# Patient Record
Sex: MALE | Race: WHITE
[De-identification: names, ages, dates, MRNs, and addresses within clinical notes are randomized per-mention and may not be internally consistent; named-entity substitution may affect disease eponyms.]

---

## 2017-06-23 ENCOUNTER — HOSPITAL ENCOUNTER (INPATIENT)
Dept: HOSPITAL 17 - NEPD | Age: 62
LOS: 3 days | Discharge: NURSING, REHAB OR LONG-TERM CARE | DRG: 885 | End: 2017-06-26
Attending: PSYCHIATRY & NEUROLOGY | Admitting: PSYCHIATRY & NEUROLOGY
Payer: MEDICAID

## 2017-06-23 VITALS
RESPIRATION RATE: 20 BRPM | OXYGEN SATURATION: 96 % | HEART RATE: 66 BPM | DIASTOLIC BLOOD PRESSURE: 93 MMHG | SYSTOLIC BLOOD PRESSURE: 130 MMHG

## 2017-06-23 VITALS
SYSTOLIC BLOOD PRESSURE: 130 MMHG | OXYGEN SATURATION: 96 % | TEMPERATURE: 97.7 F | RESPIRATION RATE: 20 BRPM | HEART RATE: 60 BPM | DIASTOLIC BLOOD PRESSURE: 86 MMHG

## 2017-06-23 VITALS
TEMPERATURE: 97.2 F | DIASTOLIC BLOOD PRESSURE: 77 MMHG | OXYGEN SATURATION: 97 % | SYSTOLIC BLOOD PRESSURE: 119 MMHG | HEART RATE: 71 BPM | RESPIRATION RATE: 18 BRPM

## 2017-06-23 VITALS — WEIGHT: 154.32 LBS | BODY MASS INDEX: 23.39 KG/M2 | HEIGHT: 68 IN

## 2017-06-23 DIAGNOSIS — J44.9: ICD-10-CM

## 2017-06-23 DIAGNOSIS — F20.0: Primary | ICD-10-CM

## 2017-06-23 DIAGNOSIS — M19.90: ICD-10-CM

## 2017-06-23 DIAGNOSIS — R47.9: ICD-10-CM

## 2017-06-23 LAB
AMPHETAMINE, URINE: (no result)
ANION GAP SERPL CALC-SCNC: 4 MEQ/L (ref 5–15)
BARBITURATES, URINE: (no result)
BASOPHILS # BLD AUTO: 0 TH/MM3 (ref 0–0.2)
BASOPHILS NFR BLD: 1.1 % (ref 0–2)
BUN SERPL-MCNC: 20 MG/DL (ref 7–18)
CHLORIDE SERPL-SCNC: 109 MEQ/L (ref 98–107)
COCAINE UR-MCNC: (no result) NG/ML
EOSINOPHIL # BLD: 0.3 TH/MM3 (ref 0–0.4)
EOSINOPHIL NFR BLD: 7.5 % (ref 0–4)
ERYTHROCYTE [DISTWIDTH] IN BLOOD BY AUTOMATED COUNT: 14.9 % (ref 11.6–17.2)
GFR SERPLBLD BASED ON 1.73 SQ M-ARVRAT: 98 ML/MIN (ref 89–?)
HCO3 BLD-SCNC: 32.4 MEQ/L (ref 21–32)
HCT VFR BLD CALC: 40.5 % (ref 39–51)
HEMO FLAGS: (no result)
LYMPHOCYTES # BLD AUTO: 2 TH/MM3 (ref 1–4.8)
LYMPHOCYTES NFR BLD AUTO: 43.2 % (ref 9–44)
MCH RBC QN AUTO: 30.7 PG (ref 27–34)
MCHC RBC AUTO-ENTMCNC: 34.7 % (ref 32–36)
MCV RBC AUTO: 88.5 FL (ref 80–100)
MONOCYTES NFR BLD: 9 % (ref 0–8)
NEUTROPHILS # BLD AUTO: 1.8 TH/MM3 (ref 1.8–7.7)
NEUTROPHILS NFR BLD AUTO: 39.2 % (ref 16–70)
PLATELET # BLD: 227 TH/MM3 (ref 150–450)
POTASSIUM SERPL-SCNC: 4.2 MEQ/L (ref 3.5–5.1)
RBC # BLD AUTO: 4.58 MIL/MM3 (ref 4.5–5.9)
SODIUM SERPL-SCNC: 145 MEQ/L (ref 136–145)
WBC # BLD AUTO: 4.6 TH/MM3 (ref 4–11)

## 2017-06-23 PROCEDURE — 80307 DRUG TEST PRSMV CHEM ANLYZR: CPT

## 2017-06-23 PROCEDURE — 84443 ASSAY THYROID STIM HORMONE: CPT

## 2017-06-23 PROCEDURE — 83036 HEMOGLOBIN GLYCOSYLATED A1C: CPT

## 2017-06-23 PROCEDURE — 82306 VITAMIN D 25 HYDROXY: CPT

## 2017-06-23 PROCEDURE — 80053 COMPREHEN METABOLIC PANEL: CPT

## 2017-06-23 PROCEDURE — 80061 LIPID PANEL: CPT

## 2017-06-23 PROCEDURE — 82607 VITAMIN B-12: CPT

## 2017-06-23 PROCEDURE — 85025 COMPLETE CBC W/AUTO DIFF WBC: CPT

## 2017-06-23 PROCEDURE — 80164 ASSAY DIPROPYLACETIC ACD TOT: CPT

## 2017-06-23 PROCEDURE — 80048 BASIC METABOLIC PNL TOTAL CA: CPT

## 2017-06-23 RX ADMIN — GABAPENTIN SCH MG: 100 CAPSULE ORAL at 18:00

## 2017-06-23 RX ADMIN — MIRTAZAPINE SCH MG: 15 TABLET, FILM COATED ORAL at 21:20

## 2017-06-23 RX ADMIN — DIVALPROEX SODIUM SCH MG: 500 TABLET, EXTENDED RELEASE ORAL at 21:21

## 2017-06-23 NOTE — HHI.HP
Provisional Diagnosis


Admission Date


Jun 23, 2017 at 13:23


[place]


Chronic paranoid schizophrenia





                               Certification of Person's Competence 


                           To Provide Express and Informed Consent





I have personally examined Blayne Martines , a person being served 

at [facility] on, Jun 23, 2017 13:38.


Express and informed consent means consent voluntarily given in writing, by a 

competent person, after sufficient explanation and disclosure of the subject 

matter involved to enable the person to make a knowing and willful decision 

without any element of force, fraud, deceit, duress, or other form of 

constraint or coercion.





This person is 18 years of age or older, is not now known to be incompetent to 

consent to treatment with a guardian advocate, and does not have a health care 

surrogate or proxy currently making medical treatment decisions.  I have found 

this person to be one of the following:





[X] Competent to provide express and informed consent, as defined above, for 

voluntary admission to this facility and is competent to provide express and 

informed consent for treatment.  He/she has the consistent capacity to make 

well reasoned, willful, and knowing decisions concerning his or her medical or 

mental health treatment.  The person fully and consistently understands the 

purpose of the admission for examination/placement and is fully capable of 

personally exercising all rights [place]





[] Incompetent to provide express and informed consent to voluntary admission, 

and this is incompetent to provide express and informed consent to treatment.  

The person must be transferred to involuntary status and a petition for a 

guardian advocate filed with the Circuit Court.





[] Refusing to provide express and informed consent to voluntary admission but 

is competent to provide express and informed consent for treatment.  The person 

must be discharged or transferred to involuntary status.





Form shall be completed within 24 hours of a person's arrival at the receiving 

facility and filed in the clinical record of each person:


1. Admitted on a voluntary basis


2. Permitted to provide express and informed consent to his/her own treatment


3. Allowed to transfer from involuntary to voluntary status


4. Prior to permitting a person to consent to his or her own treatment after 

having been previously found incompetent to consent to treatment.





History of Present Illness


Capacity:  Has Capacity


HPI


This is a 61-year-old male with a multiyear history of chronic paranoid 

schizophrenia admitted for psychotic symptoms and dangerous behavior.  

Apparently he resides at an [facility], and was lying down in the street, 

having to be avoided by cars.  The patient is complaining of auditory 

hallucinations which have been occurring for days and are constant and 

unrelenting.  These hallucinations are critical and denigrating to the patient.

  He is unable to resist them and states his current medications have not been 

sufficiently effective to subdue them.  The patient also reports paranoia in 

general and is fearful of other people including medical and mental health care 

professionals.  He is worried that the medicines he is taking are poisoning 

him.  Indeed, the patient appears to have facial asymmetry and a speech 

impediment which may be due to tardive dyskinesia.  The patient is currently 

denying suicidal or homicidal ideation, plan or intent.  However, his actions 

represent poor judgment, hallucinations, paranoia, thought blocking and he is 

at high risk for self-harm.





Patient reports he has been compliant with his Haldol and Zyprexa.  Furthermore

, he denies a history of alcohol or substance abuse.





Review of Systems


Except as stated in HPI:  all other systems reviewed are Neg





Past Psych History


Psychological trauma history


Patient has been previously admitted here at [facility] with a diagnosis of 

schizophrenia he denies psychological traumas.


Violence risk - others (6 mos)


Minimal


Violence risk - self (6 mos)


High





Substance Abuse History


Drugs/Alcohol past 12 months


Denied





Past Family Social History


Coded Allergies:  


     No Known Allergies (Verified , 6/23/17)


 Per MAR faxed by [facility] [phone].


Active Scripts


Olanzapine 10 Mg Tab10 Mg PO BID  15 Days  Ref 1


   Prov:Tima Dowell MD         12/13/16


Mirtazapine 15 Mg Tab15 Mg PO HS  15 Days  Ref 1


   Prov:Tima Dowell MD         12/13/16


Divalproex  Mg Tab1,000 Mg PO HS  15 Days  Ref 1


   Prov:Tima Dowell MD         12/13/16


Reported Medications


Trazodone 100 Mg [id number] Mg PO HS  #30 TAB  Ref 0


   6/23/17


Meloxicam 7.5 Mg Tab7.5 Mg PO DAILY   Ref 0


   12/10/16


Haloperidol Decanoate Inj 100 Mg/Ml Sus493 Mg IM Q14D  #1 VIAL  Ref 0


   12/10/16


Gabapentin 100 Mg [id number] Mg PO TID  #90 CAP  Ref 0


   12/10/16





 Current Medications








 Medications


  (Trade)  Dose


 Ordered  Sig/Jennie


 Route  Start Time


 Stop Time Status Last Admin


 


  (Ativan)  1 mg  Q6H  PRN


 PO  6/23/17 13:30


   UNV  


 


 


  (Ativan Inj)  1 mg  Q6H  PRN


 IM  6/23/17 13:30


   UNV  


 


 


  (Ativan)  0.5 mg  Q12H  PRN


 PO  6/23/17 13:30


   UNV  


 


 


  (Ativan Inj)  0.5 mg  Q12H  PRN


 IM  6/23/17 13:30


   UNV  


 


 


  (Benadryl)  50 mg  Q6H  PRN


 PO  6/23/17 13:30


   UNV  


 


 


  (Benadryl Inj)  50 mg  Q6H  PRN


 IM  6/23/17 13:30


   UNV  


 


 


  (Tylenol)  650 mg  Q4H  PRN


 PO  6/23/17 13:30


   UNV  


 


 


  (Milk Of


 Magnesia Liq)  30 ml  DAILY  PRN


 PO  6/23/17 13:30


   UNV  


 


 


  (Mag-Al Plus


 Susp Liq)  30 ml  Q6H  PRN


 PO  6/23/17 13:30


   UNV  


 


 


  (Cogentin)  1 mg  Q12H  PRN


 PO  6/23/17 13:30


   UNV  


 








Family History


Denied for mental illness and substance abuse as well as alcoholism


Social History


Patient is unemployed.  He receives Social Security disability.  He is not 

.  He denies a history of alcohol or drug abuse.


Patient's Strengths (min. 2)


Verbal and resilient.





Physical Exam


GENERAL: 


SKIN: Warm and dry.


HEAD: Normocephalic.


EYES: No scleral icterus. No injection or drainage. 


NECK: Supple, trachea midline. No JVD or lymphadenopathy.


CARDIOVASCULAR: Regular rate and rhythm without murmurs, gallops, or rubs. 


RESPIRATORY: Breath sounds equal bilaterally. No accessory muscle use.


GASTROINTESTINAL: Abdomen soft, non-tender, nondistended. 


MUSCULOSKELETAL: No cyanosis, or edema. 


BACK: Nontender without obvious deformity. No CVA tenderness.





Vital Signs





 Vital Signs








  Date Time  Temp Pulse Resp B/P Pulse Ox O2 Delivery O2 Flow Rate FiO2


 


6/23/17 09:23  65 18     


 


6/23/17 09:11 97.7   130/86 96 Room Air  











Mental Status Examination


Speech:  Slow, Other


Orientation:  x3


Memory:  Unremarkable


Thought Process:  Thought Blocking


Thought Content:  Bizarre thinking, Paranoid


Hallucination Type:  Auditory


Attention and Concentration:  Abnormal


Suicidal Ideation:  No


Previous Suicide Attempts:  Yes


Homicidal Ideation:  No


Previous Homicide Attempts:  No


Insight:  Fair


Judgment:  Unrealistic


Affect:  Anxious


Mood:  Anxious


Motor Activity:  Normal gait





Assessment & Plan


Problem List:  


(1) Acute exacerbation of chronic paranoid schizophrenia


ICD Code:  F20.0


Assessment & Plan


Estimated LOS:  days this is a 61-year-old man with a long history of chronic 

paranoid schizophrenia, currently having an acute exacerbation.  He is having 

auditory hallucinations and increasing paranoia.  The hallucinations have not 

been responsive to treatment with Haldol and Zyprexa.  The patient put himself 

in front of traffic in the middle of the street today.  He does not appear to 

have adequate insight or judgment with regard to his behavior.  He continues to 

appear to respond to internal stimuli.  He is considered at high risk for self-

harm.


The patient is being admitted for observation, evaluation and alteration of 

medications.  He indicates that the Haldol and Zyprexa he has been taking are 

not working.  He has not tried Abilify and this physician started him on this 

medicine with the idea that he may be placed on long-acting Abilify before he 

leaves.  He will be remaining on close observation for now.  This physician 

also ordered a CBC and comprehensive metabolic profile to ensure he does not 

have a infectious process or metabolic process which is exacerbating his 

underlying psychosis.  For this reason, he will also get a TSH level and case 

hyper or hypo-thyroidism is causing his psychosis.  A Depakote level will be 

obtained to determine if it is therapeutic and if the patient is being 

compliant with that medication.  The patient will also have vitamin B 12 and 

vitamin D levels checked because cognitive impairment can be the result of a 

lack of vitamin D or B-12.  Finally, we will obtain an EKG to rule out any 

cardiac conduction problems which might be exacerbated by his current multiple 

antipsychotics.  This physician spoke with the patient's nurse regarding his 

current and recent behavior.  This physician will contact the patient's case 

worker to obtain further history and appropriate disposition planning.








Salo Elizabeth MD Jun 23, 2017 13:48

## 2017-06-23 NOTE — PD
HPI


Chief Complaint:  Psychiatric Symptoms


Time Seen by Provider:  09:34


Travel History


International Travel<30 days:  No


Contact w/Intl Traveler<30days:  No


Traveled to known affect area:  No





History of Present Illness


HPI


This is a 61-year-old male who presents to the emergency department with a 

history of paranoid schizophrenia reporting that his hallucinations are getting 

worse.  He says he is hearing voices.  He is not having thoughts of hurting 

himself or others but he said he tried to walk into the road at [facility] and 

that worried them and they sent him here.  He denies any other complaints.





PFSH


Past Medical History


Arthritis:  Yes (IN LEGS )


Autoimmune Disease:  No


Blood Disorders:  No


Anxiety:  Yes


Depression:  Yes


Cancer:  No


Chemotherapy:  No


Chest Pain:  No


Congestive Heart Failure:  No


COPD:  Yes


Cerebrovascular Accident:  No


Diabetes:  No (Per patient)


Diminished Hearing:  No


Endocrine:  No


Gastrointestinal Disorders:  No


GERD:  No


Genitourinary:  No


Hiatal Hernia:  No


Immune Disorder:  No


Implanted Vascular Access Dvce:  No


Kidney Stones:  No


Musculoskeletal:  Yes


Neurologic:  No


Psychiatric:  Yes (Inpatient, outpatient, psychotropic medications, dx of 

schizophrenia)


Reproductive:  No


Respiratory:  Yes (COPD)


Immunizations Current:  Yes


Migraines:  No


Radiation Therapy:  No


Renal Failure:  No


Schizophrenia:  Yes


Seizures:  No


Sickle Cell Disease:  No


Thyroid Disease:  No


Ulcer:  No


Tetanus Vaccination:  > 5 Years


Influenza Vaccination:  No


Pregnant?:  Not Pregnant





Past Surgical History


Abdominal Surgery:  Yes (GALL BLADDER REMOVED)


AICD:  No


Arteriovenous Shunt:  No


Cardiac Surgery:  No


Cholecystectomy:  Yes


Ear Surgery:  No


Endocrine Surgery:  No


Eye Surgery:  No


Genitourinary Surgery:  No


Gynecologic Surgery:  No


Insulin Pump:  No


Joint Replacement:  No


Oral Surgery:  No


Pacemaker:  No


Thoracic Surgery:  No


Other Surgery:  Yes





Social History


Alcohol Use:  No


Tobacco Use:  Yes


Substance Use:  No (Patient denies. )





Allergies-Medications


(Allergen,Severity, Reaction):  


Coded Allergies:  


     No Known Allergies (Verified , 6/23/17)


 Per MAR faxed by [facility] [phone].


Reported Meds & Prescriptions





Reported Meds & Active Scripts


Active


Olanzapine 10 Mg Tab 10 Mg PO BID 15 Days


Mirtazapine 15 Mg Tab 15 Mg PO HS 15 Days


Divalproex ER (Divalproex Sodium) 500 Mg Tab 1,000 Mg PO HS 15 Days


Reported


Trazodone (Trazodone HCl) 100 Mg Tablet 100 Mg PO HS


Haloperidol Decanoate Inj (Haloperidol Decanoate) 100 Mg/Ml Inj 100 Mg IM Q14D


Gabapentin 100 Mg Cap 100 Mg PO TID








Review of Systems


Except as stated in HPI:  all other systems reviewed are Neg





Physical Exam


Narrative


GENERAL:Well appearing, no acute distress


SKIN: Focused skin assessment warm and dry.


HEAD: Atraumatic. Normocephalic. 


EYES: Pupils equal and round.  No injection or drainage. 


ENT:  Moist mucous membranes


NECK: Trachea midline. 


CARDIOVASCULAR: Regular rate and rhythm.  No murmur appreciated.


RESPIRATORY: Clear to auscultation. Breath sounds equal bilaterally. 


GASTROINTESTINAL: Abdomen soft, non-tender, nondistended. 


MUSCULOSKELETAL: No obvious deformities. 


NEUROLOGICAL: Awake and alert. No obvious cranial nerve deficits.  Moving all 

extremities.


PSYCHIATRIC: Flat affect, acknowledging auditory hallucinations, no SI or HI





Data


Data


Last Documented VS





Vital Signs








  Date Time  Temp Pulse Resp B/P Pulse Ox O2 Delivery O2 Flow Rate FiO2


 


6/23/17 09:23  65 18     


 


6/23/17 09:11 97.7   130/86 96 Room Air  








Orders








 Complete Blood Count With Diff (6/23/17 09:36)


Basic Metabolic Panel (Bmp) (6/23/17 09:36)


Alcohol (Ethanol) (6/23/17 09:36)


Drug Screen, Random Urine (6/23/17 09:36)


Psych Screen (6/23/17 10:02)


Diet Regular Basic (6/23/17 Lunch)








Labs








 Laboratory Tests








Test 6/23/17 6/23/17





 09:30 11:25


 


White Blood Count 4.6 TH/MM3 


 


Red Blood Count 4.58 MIL/MM3 


 


Hemoglobin 14.1 GM/DL 


 


Hematocrit 40.5 % 


 


Mean Corpuscular Volume 88.5 FL 


 


Mean Corpuscular Hemoglobin 30.7 PG 


 


Mean Corpuscular Hemoglobin 34.7 % 





Concent  


 


Red Cell Distribution Width 14.9 % 


 


Platelet Count 227 TH/MM3 


 


Mean Platelet Volume 7.5 FL 


 


Neutrophils (%) (Auto) 39.2 % 


 


Lymphocytes (%) (Auto) 43.2 % 


 


Monocytes (%) (Auto) 9.0 % 


 


Eosinophils (%) (Auto) 7.5 % 


 


Basophils (%) (Auto) 1.1 % 


 


Neutrophils # (Auto) 1.8 TH/MM3 


 


Lymphocytes # (Auto) 2.0 TH/MM3 


 


Monocytes # (Auto) 0.4 TH/MM3 


 


Eosinophils # (Auto) 0.3 TH/MM3 


 


Basophils # (Auto) 0.0 TH/MM3 


 


CBC Comment DIFF FINAL  


 


Differential Comment   


 


Sodium Level 145 MEQ/L 


 


Potassium Level 4.2 MEQ/L 


 


Chloride Level 109 MEQ/L 


 


Carbon Dioxide Level 32.4 MEQ/L 


 


Anion Gap 4 MEQ/L 


 


Blood Urea Nitrogen 20 MG/DL 


 


Creatinine 0.80 MG/DL 


 


Estimat Glomerular Filtration 98 ML/MIN 





Rate  


 


Random Glucose 70 MG/DL 


 


Calcium Level 8.7 MG/DL 


 


Ethyl Alcohol Level LESS THAN 3 





 MG/DL 


 


Urine Opiates Screen  NEG 


 


Urine Barbiturates Screen  NEG 


 


Urine Amphetamines Screen  NEG 


 


Urine Benzodiazepines Screen  NEG 


 


Urine Cocaine Screen  NEG 


 


Urine Cannabinoids Screen  NEG 














MDM


Medical Decision Making


Medical Screen Exam Complete:  Yes


Emergency Medical Condition:  Yes


Interpretation(s)


Afebrile, no tachycardia, normotensive


No leukocytosis


Electrolytes are reassuring


Drug screen is negative


Alcohol negative


Differential Diagnosis


Schizophrenia, bipolar disorder, psychosis, electrolyte abnormality


Narrative Course


This is a 61-year-old male who presents to the emergency department with 

increased auditory hallucinations.  Labs are obtained which are reassuring.  He 

has no medical complaints.  I Think he is medically cleared to be evaluated by 

psychiatry.  The plan is for admission for schizophrenia.





Diagnosis





 Primary Impression:  


 Schizophrenia


 Qualified Code:  F20.89 - Other schizophrenia





Admitting Information


Admitting Physician Requests:  it








Annabella Bucio MD Jun 23, 2017 09:38

## 2017-06-24 VITALS
SYSTOLIC BLOOD PRESSURE: 132 MMHG | OXYGEN SATURATION: 98 % | TEMPERATURE: 97.4 F | DIASTOLIC BLOOD PRESSURE: 60 MMHG | HEART RATE: 61 BPM | RESPIRATION RATE: 18 BRPM

## 2017-06-24 VITALS
SYSTOLIC BLOOD PRESSURE: 106 MMHG | TEMPERATURE: 97.4 F | OXYGEN SATURATION: 93 % | RESPIRATION RATE: 16 BRPM | HEART RATE: 50 BPM | DIASTOLIC BLOOD PRESSURE: 68 MMHG

## 2017-06-24 LAB
ALP SERPL-CCNC: 46 U/L (ref 45–117)
ALT SERPL-CCNC: 21 U/L (ref 12–78)
ANION GAP SERPL CALC-SCNC: 5 MEQ/L (ref 5–15)
AST SERPL-CCNC: 12 U/L (ref 15–37)
BASOPHILS # BLD AUTO: 0 TH/MM3 (ref 0–0.2)
BASOPHILS NFR BLD: 0.9 % (ref 0–2)
BILIRUB SERPL-MCNC: 0.3 MG/DL (ref 0.2–1)
BUN SERPL-MCNC: 17 MG/DL (ref 7–18)
CHLORIDE SERPL-SCNC: 106 MEQ/L (ref 98–107)
EOSINOPHIL # BLD: 0.3 TH/MM3 (ref 0–0.4)
EOSINOPHIL NFR BLD: 5.8 % (ref 0–4)
ERYTHROCYTE [DISTWIDTH] IN BLOOD BY AUTOMATED COUNT: 14.5 % (ref 11.6–17.2)
GFR SERPLBLD BASED ON 1.73 SQ M-ARVRAT: 98 ML/MIN (ref 89–?)
HCO3 BLD-SCNC: 31.7 MEQ/L (ref 21–32)
HCT VFR BLD CALC: 38 % (ref 39–51)
HDLC SERPL-MCNC: 29 MG/DL (ref 40–60)
HEMO FLAGS: (no result)
LDLC SERPL-MCNC: 82 MG/DL (ref 0–99)
LYMPHOCYTES # BLD AUTO: 1.9 TH/MM3 (ref 1–4.8)
LYMPHOCYTES NFR BLD AUTO: 38.5 % (ref 9–44)
MCH RBC QN AUTO: 30.6 PG (ref 27–34)
MCHC RBC AUTO-ENTMCNC: 34.3 % (ref 32–36)
MCV RBC AUTO: 89.2 FL (ref 80–100)
MONOCYTES NFR BLD: 8.9 % (ref 0–8)
NEUTROPHILS # BLD AUTO: 2.3 TH/MM3 (ref 1.8–7.7)
NEUTROPHILS NFR BLD AUTO: 45.9 % (ref 16–70)
PLATELET # BLD: 201 TH/MM3 (ref 150–450)
POTASSIUM SERPL-SCNC: 4 MEQ/L (ref 3.5–5.1)
RBC # BLD AUTO: 4.27 MIL/MM3 (ref 4.5–5.9)
SODIUM SERPL-SCNC: 143 MEQ/L (ref 136–145)
VIT B12 SERPL-MCNC: 499 PG/ML (ref 193–986)
WBC # BLD AUTO: 5 TH/MM3 (ref 4–11)

## 2017-06-24 RX ADMIN — MIRTAZAPINE SCH MG: 15 TABLET, FILM COATED ORAL at 21:13

## 2017-06-24 RX ADMIN — GABAPENTIN SCH MG: 100 CAPSULE ORAL at 12:52

## 2017-06-24 RX ADMIN — GABAPENTIN SCH MG: 100 CAPSULE ORAL at 08:17

## 2017-06-24 RX ADMIN — DIVALPROEX SODIUM SCH MG: 500 TABLET, EXTENDED RELEASE ORAL at 21:13

## 2017-06-24 RX ADMIN — GABAPENTIN SCH MG: 100 CAPSULE ORAL at 17:53

## 2017-06-24 RX ADMIN — MELOXICAM SCH MG: 7.5 TABLET ORAL at 08:17

## 2017-06-24 NOTE — HHI.PYPN
Subjective


Remarks


Pt seen and discussed with staff. Pt reports that his thoughts are clear today 

and he slept better last night. He has been pacing hallways and  continues to 

respond to internal stimuli. He is compliant with medication and denies side 

effects. No SI/HI





Objective


Alert:  Yes


[place]:  Person, Place, Date


Mood:  Calm


Affect:  Flat


Memory Intact:  Immediate, Recent, Remote


Hallucinations:  Auditory


Delusions:  Yes


Delusion Type:  Grandiose, Paranoid


Suicidal:  Ideation (denies)


Homicidal:  Ideation (denies)


Insight/Judgment


poor


Labs











Test 6/24/17





 10:03


 


White Blood Count 5.0 TH/MM3


 


Red Blood Count 4.27 MIL/MM3


 


Hemoglobin 13.0 GM/DL


 


Hematocrit 38.0 %


 


Mean Corpuscular Volume 89.2 FL


 


Mean Corpuscular Hemoglobin 30.6 PG


 


Mean Corpuscular Hemoglobin 34.3 %





Concent 


 


Red Cell Distribution Width 14.5 %


 


Platelet Count 201 TH/MM3


 


Mean Platelet Volume 7.7 FL


 


Neutrophils (%) (Auto) 45.9 %


 


Lymphocytes (%) (Auto) 38.5 %


 


Monocytes (%) (Auto) 8.9 %


 


Eosinophils (%) (Auto) 5.8 %


 


Basophils (%) (Auto) 0.9 %


 


Neutrophils # (Auto) 2.3 TH/MM3


 


Lymphocytes # (Auto) 1.9 TH/MM3


 


Monocytes # (Auto) 0.4 TH/MM3


 


Eosinophils # (Auto) 0.3 TH/MM3


 


Basophils # (Auto) 0.0 TH/MM3


 


CBC Comment DIFF FINAL 


 


Differential Comment  


 


Sodium Level 143 MEQ/L


 


Potassium Level 4.0 MEQ/L


 


Chloride Level 106 MEQ/L


 


Carbon Dioxide Level 31.7 MEQ/L


 


Anion Gap 5 MEQ/L


 


Blood Urea Nitrogen 17 MG/DL


 


Creatinine 0.80 MG/DL


 


Estimat Glomerular Filtration 98 ML/MIN





Rate 


 


Random Glucose 69 MG/DL


 


Calcium Level 8.2 MG/DL


 


Total Bilirubin 0.3 MG/DL


 


Aspartate Amino Transf 12 U/L





(AST/SGOT) 


 


Alanine Aminotransferase 21 U/L





(ALT/SGPT) 


 


Alkaline Phosphatase 46 U/L


 


Total Protein 6.8 GM/DL


 


Albumin 2.9 GM/DL


 


Triglycerides Level 92 MG/DL


 


Cholesterol Level 129 MG/DL


 


LDL Cholesterol 82 MG/DL


 


HDL Cholesterol 29.0 MG/DL


 


Cholesterol/HDL Ratio 4.44 RATIO


 


Vitamin B12 Level 499 PG/ML


 


25-Hydroxy Vitamin D Total 37.9 ng/ML


 


Thyroid Stimulating Hormone 2.480 uIU/ML





3rd Gen 


 


Valproic Acid (Depakene) Level 76 MCG/ML








Vitals/IOs





 Vital Signs








  Date Time  Temp Pulse Resp B/P Pulse Ox O2 Delivery O2 Flow Rate FiO2


 


6/24/17 18:05 97.4 61 18 132/60 98   


 


6/23/17 14:10      Room Air  











Assessment & Plan


Problem List:  


(1) Acute exacerbation of chronic paranoid schizophrenia


ICD Code:  F20.0


Assessment & Plan


Continue current tx plan. Estimated LOS:  days


Justification for Cont. Inpt.


impairments in reality construction








Jennie Garcia MD Jun 24, 2017 18:55

## 2017-06-25 VITALS
SYSTOLIC BLOOD PRESSURE: 103 MMHG | OXYGEN SATURATION: 96 % | RESPIRATION RATE: 18 BRPM | HEART RATE: 58 BPM | DIASTOLIC BLOOD PRESSURE: 60 MMHG | TEMPERATURE: 99.1 F

## 2017-06-25 VITALS
DIASTOLIC BLOOD PRESSURE: 68 MMHG | TEMPERATURE: 97.6 F | OXYGEN SATURATION: 96 % | SYSTOLIC BLOOD PRESSURE: 117 MMHG | RESPIRATION RATE: 16 BRPM | HEART RATE: 49 BPM

## 2017-06-25 RX ADMIN — MIRTAZAPINE SCH MG: 15 TABLET, FILM COATED ORAL at 20:45

## 2017-06-25 RX ADMIN — MELOXICAM SCH MG: 7.5 TABLET ORAL at 08:51

## 2017-06-25 RX ADMIN — GABAPENTIN SCH MG: 100 CAPSULE ORAL at 12:17

## 2017-06-25 RX ADMIN — GABAPENTIN SCH MG: 100 CAPSULE ORAL at 17:17

## 2017-06-25 RX ADMIN — DIVALPROEX SODIUM SCH MG: 500 TABLET, EXTENDED RELEASE ORAL at 20:45

## 2017-06-25 RX ADMIN — GABAPENTIN SCH MG: 100 CAPSULE ORAL at 08:51

## 2017-06-25 NOTE — HHI.PYPN
Subjective


Remarks


Pt seen and discussed with staff. He has been sleeping most of the day, but has 

come out for meals. He remains internally preoccupied and has been observed 

having conversations with unseen entities. Compliant with medications. No SI/HI





Objective


Alert:  Yes


[place]:  Person, Place, Date


Mood:  Calm


Affect:  Flat


Memory Intact:  Immediate, Recent, Remote


Hallucinations:  Auditory


Delusions:  Yes


Delusion Type:  Paranoid


Suicidal:  Ideation (denies)


Homicidal:  Ideation (denies)


Insight/Judgment


poor


Vitals/IOs





 Vital Signs








  Date Time  Temp Pulse Resp B/P Pulse Ox O2 Delivery O2 Flow Rate FiO2


 


6/25/17 04:57 97.6 49 16 117/68 96   


 


6/23/17 14:10      Room Air  








 Intake and Output








 6/24/17 6/24/17 6/25/17





 08:00 16:00 00:00


 


Intake Total  320 ml 


 


Balance  320 ml 











Assessment & Plan


Problem List:  


(1) Acute exacerbation of chronic paranoid schizophrenia


ICD Code:  F20.0


Assessment & Plan


Continue current tx plan. Estimated LOS:  days


Justification for Cont. Inpt.


risk of decompensation








Jennie Garcia MD Jun 25, 2017 15:56

## 2017-06-26 LAB
HEMOGLOBIN A1A: 1.2 %
HEMOGLOBIN A1B: 0.8 %
HEMOGLOBIN AO: 86.2 %
HEMOGLOBIN LA1C: 1.2 %
HEMOGLOBIN P3: 3.4 %
HGB F MFR BLD: 1.2 %

## 2017-06-26 RX ADMIN — GABAPENTIN SCH MG: 100 CAPSULE ORAL at 13:00

## 2017-06-26 RX ADMIN — MELOXICAM SCH MG: 7.5 TABLET ORAL at 09:00

## 2017-06-26 RX ADMIN — GABAPENTIN SCH MG: 100 CAPSULE ORAL at 09:00

## 2017-06-26 NOTE — HHI.DS
Psychiatry Discharge Summary


Inpatient Psychiatric care?:  Yes


Advance Directive:  No


Reason Not Provided:  Due to Patient Condition


Mental Health AdvanceDirective:  No


Health Care Proxy:  No


Admission


Admission Date


Jun 23, 2017 at 13:23


Admission Diagnosis:  


(1) Schizophrenia


ICD Code:  F20.9


Brief History


This is a 61-year-old male with a multiyear history of chronic paranoid 

schizophrenia admitted for psychotic symptoms and dangerous behavior.  

Apparently he resides at an [facility], and was lying down in the street, 

having to be avoided by cars.  The patient is complaining of auditory 

hallucinations which have been occurring for days and are constant and 

unrelenting.  These hallucinations are critical and denigrating to the patient.

  He is unable to resist them and states his current medications have not been 

sufficiently effective to subdue them.  The patient also reports paranoia in 

general and is fearful of other people including medical and mental health care 

professionals.  He is worried that the medicines he is taking are poisoning 

him.  Indeed, the patient appears to have facial asymmetry and a speech 

impediment which may be due to tardive dyskinesia.  The patient is currently 

denying suicidal or homicidal ideation, plan or intent.  However, his actions 

represent poor judgment, hallucinations, paranoia, thought blocking and he is 

at high risk for self-harm.





Patient reports he has been compliant with his Haldol and Zyprexa.  Furthermore

, he denies a history of alcohol or substance abuse.


Tobacco Use In Past 30 Days:  No Tobacco Past 30 Days


Alcohol Use:  Never


Hospital Course


Patient was admitted to a locked, inpatient psychiatric unit.  Appropriate 

precautions were in place throughout patient's hospital stay.  The patient was 

seen and examined daily on the unit by psychiatry and also visited by 

counselor.  Psychotropic medications were adjusted.  The patient will be 

started on long-acting injectable Abilify prior to discharge as he is 

tolerating oral agent well to improve adherence and promote stability in the 

community.  Patient had improvement in his presenting psychiatric symptoms 

during the course of his hospital stay.  He remained in good behavioral control 

and there was no evidence of any suicidality or homicidality on the inpatient 

unit.  Charting indicates that the patient has been sleeping and eating well.  

Counselor has called to patient's facility, and they are agreeable to accepting 

the patient back today.  On the day of discharge: Patient seen and examined 

with nurse.  Chart reviewed.  Case discussed with nursing after.  No staff.  

The patient has been no behavioral problem overnight.  On my examination today, 

the patient is calm and cooperative.  He feels improved versus admission.  He 

feels ready to leave the hospital and return to his facility at this time.  He 

denies any suicidal or homicidal ideation, intent or plan.  He denies any 

audiovisual hallucinations.  He denies any command auditory hallucinations.  I 

can elicit no delusional material.  Mood is stable and I can elicit no 

depressive or hypomanic/manic symptoms.  He denies any side effects from 

medications.  He offers no physical complaints.  Weighing the acute, chronic, 

and protective factors and based on the available evidence, I  to a 

reasonable degree of medical certainty that the patient is at low imminent risk 

of harm to self or others from a mental illness as defined under the [place] act 

and his level of function is adequate for outpatient care.  Patient will be 

discharged back to facility today with psychiatric follow-up as arranged by 

counselor.  Patient is also follow-up with primary care.  Patient is to return 

to the psychiatric emergency room for any concerning psychiatric symptoms.





Results


Blood Pressure


103 / 60





 Vital Signs








  Date Time  Temp Pulse Resp B/P Pulse Ox O2 Delivery O2 Flow Rate FiO2


 


6/25/17 18:28 99.1 58 18 103/60 96   


 


6/23/17 14:10      Room Air  











Laboratory Tests








Test 6/24/17





 10:03


 


Red Blood Count 4.27 MIL/MM3





 (4.50-5.90)


 


Hematocrit 38.0 %





 (39.0-51.0)


 


Monocytes (%) (Auto) 8.9 % (0.0-8.0)


 


Eosinophils (%) (Auto) 5.8 % (0.0-4.0)


 


Random Glucose 69 MG/DL





 ()


 


Calcium Level 8.2 MG/DL





 (8.5-10.1)


 


Aspartate Amino Transf 12 U/L (15-37)





(AST/SGOT) 


 


Albumin 2.9 GM/DL





 (3.4-5.0)


 


HDL Cholesterol 29.0 MG/DL





 (40.0-60.0)








 Laboratory Results








Test 6/24/17





 10:03


 


Triglycerides Level 92 MG/DL





 ()


 


Cholesterol Level 129 MG/DL





 (120-200)


 


LDL Cholesterol 82 MG/DL (0-99)


 


HDL Cholesterol 29.0 MG/DL





 (40.0-60.0)


 


Valproic Acid (Depakene) Level 76 MCG/ML





 ()








Summary of Procedures


None done


Imaging


None done


Pending results at discharge:  No





Medications


# of Antipsychotic meds at D/C:  1


Approp Antipsych med options


1 - Minimum of three failed multiple trials of monotherapy.


2 - Documented plan to taper to monotherapy due to previous use of multiple 

meds OR cross-taper in progress at D/C.


3 - Documentation of augmentation of Clozapine.


4 - Justification other than those listed in allowable values 1-3, document here

:





Discharge


Discharge Date:  Jun 26, 2017


Discharge Diagnosis:  


(1) Schizophrenia


Diagnosis:  Principal (stabilized)


ICD Code:  F20.9


GAF on discharge is 55.


Mental Status Exam at Disch


Patient is in hospital gown.  He is well groomed.  He is awake and alert and 

oriented to person and hospital at least.  No evidence of delirium.  No 

abnormal motor movements noted.  Speech is within normal limits for rate, tone 

and volume.  Language and fund of knowledge seemed average.  Focus and 

concentration grossly intact.  Memory grossly intact on clinical exam.  Mood is 

fair and affect is blunted.  Thought process linear.  No loosening of 

associations.  No evident delusional material.  Denies audiovisual 

hallucinations and does not appear internally preoccupied at this time.  Denies 

suicidal or homicidal ideation, intent or plan.  Insight and judgment are 

likely chronically poor.


Pt Condition on Discharge:  Stable


Discharge Disposition:  [facility]





Discharge Instructions


Diet Instructions:  As Tolerated, No Restrictions


Activities you can perform:  Weight Bearing as Aurea


Scheduled Appointment:  as per counselor's notes


New Medications:  


Aripiprazole ER Inj (Abilify Maintena ER Inj) 400 Mg Susp


400 MG IM Q28D This dose of Abilify Maintena is due on 7/24/17. Mental Health #

1 Ref 0 INJECTION


Aripiprazole (Aripiprazole) 10 Mg Tab


10 MG PO HS Continue Abilify by mouth for 2 weeks then stop.  Be sure to get 

your next Abilify Maintena injection. Mental Health Days 15 Ref 1 TAB


 


Continued Medications:  


Divalproex ER (Divalproex ER) 500 Mg Tab


1000 MG PO HS Mental Health Days 15 Ref 1 TAB (This prescription has been 

renewed)


Gabapentin (Gabapentin) 100 Mg Cap


100 MG PO TID Health Days 15 Ref 1 CAP (This prescription has been renewed)


Meloxicam (Meloxicam) 7.5 Mg Tab


7.5 MG PO DAILY Arthritis Pain Ref 0 TAB


Mirtazapine (Mirtazapine) 15 Mg Tab


15 MG PO HS Mental Health Days 15 Ref 1 TAB (This prescription has been renewed)


Trazodone (Trazodone) 100 Mg Tablet


100 MG PO HS Sleep Days 15 Ref 1 TAB (This prescription has been renewed)


 


Discontinued Medications:  


Haloperidol Decanoate Inj (Haloperidol Decanoate Inj) 100 Mg/Ml Inj


100 MG IM Q14D Schizophrenia #1 Ref 0 VIAL


Olanzapine (Olanzapine) 10 Mg Tab


10 MG PO BID Mental Health Days 15 Ref 1 TAB








Discharge Time


<= 30 minutes





Discharge/Advance Care Plan


Health Problems:  


(1) Acute exacerbation of chronic paranoid schizophrenia


Goals to promote your health


* To prevent worsening of your condition and complications


* To maintain your health at the optimal level


Directions to meet your goals


*** Take your medications as prescribed


***  Follow your dietary instruction


***  Follow activity as directed





***  Keep your appointments as scheduled


***  Take your immunizations and boosters as scheduled


***  If your symptoms worsen call your PCP, if no PCP go to Urgent Care Center 

or Emergency Room ***


***  For 24/7 questions related to your inpatient stay or results of tests 

pending at discharge, please contact Dr. Tima Dowell at [phone]


***  Smoking is Dangerous to Your Health. Avoid second hand smoking ***





Problem Qualifiers





(1) Schizophrenia:  


Qualified Code:  F20.0 - Paranoid schizophrenia





Tima Dowell MD Jun 26, 2017 12:43

## 2017-07-11 ENCOUNTER — HOSPITAL ENCOUNTER (EMERGENCY)
Dept: HOSPITAL 17 - NEPD | Age: 62
LOS: 1 days | Discharge: HOME | End: 2017-07-12
Payer: COMMERCIAL

## 2017-07-11 VITALS — HEART RATE: 72 BPM | SYSTOLIC BLOOD PRESSURE: 118 MMHG | RESPIRATION RATE: 16 BRPM | DIASTOLIC BLOOD PRESSURE: 83 MMHG

## 2017-07-11 VITALS — WEIGHT: 154.32 LBS | HEIGHT: 68 IN | BODY MASS INDEX: 23.39 KG/M2

## 2017-07-11 VITALS — TEMPERATURE: 98 F | OXYGEN SATURATION: 99 %

## 2017-07-11 DIAGNOSIS — F20.0: Primary | ICD-10-CM

## 2017-07-11 LAB
BASOPHILS # BLD AUTO: 0 TH/MM3 (ref 0–0.2)
BASOPHILS NFR BLD: 0.4 % (ref 0–2)
EOSINOPHIL # BLD: 0.1 TH/MM3 (ref 0–0.4)
EOSINOPHIL NFR BLD: 1.8 % (ref 0–4)
ERYTHROCYTE [DISTWIDTH] IN BLOOD BY AUTOMATED COUNT: 15.2 % (ref 11.6–17.2)
HCT VFR BLD CALC: 42.6 % (ref 39–51)
HEMO FLAGS: (no result)
LYMPHOCYTES # BLD AUTO: 3.2 TH/MM3 (ref 1–4.8)
LYMPHOCYTES NFR BLD AUTO: 46.4 % (ref 9–44)
MCH RBC QN AUTO: 30.5 PG (ref 27–34)
MCHC RBC AUTO-ENTMCNC: 33.9 % (ref 32–36)
MCV RBC AUTO: 89.9 FL (ref 80–100)
MONOCYTES NFR BLD: 6.8 % (ref 0–8)
NEUTROPHILS # BLD AUTO: 3.1 TH/MM3 (ref 1.8–7.7)
NEUTROPHILS NFR BLD AUTO: 44.6 % (ref 16–70)
PLATELET # BLD: 210 TH/MM3 (ref 150–450)
RBC # BLD AUTO: 4.73 MIL/MM3 (ref 4.5–5.9)
WBC # BLD AUTO: 6.9 TH/MM3 (ref 4–11)

## 2017-07-11 PROCEDURE — 85025 COMPLETE CBC W/AUTO DIFF WBC: CPT

## 2017-07-11 PROCEDURE — 80053 COMPREHEN METABOLIC PANEL: CPT

## 2017-07-11 PROCEDURE — 80307 DRUG TEST PRSMV CHEM ANLYZR: CPT

## 2017-07-11 PROCEDURE — 99283 EMERGENCY DEPT VISIT LOW MDM: CPT

## 2017-07-11 NOTE — PD
Physical Exam


Date Seen by Provider:  Jul 11, 2017


Time Seen by Provider:  21:55


Narrative


62 yo male here for psych eval. Sent here by his residence for evaluation of 

suicidal statement. History of paranoid schizophrenic. Well known to staff with 

multiple admissions in the past. He was "dropped off" by someone from the 

assisted living facility where he resides. 


Vitals are stable in triage. Awaiting Bed placement.





Data


Data


Last Documented VS





Vital Signs








  Date Time  Temp Pulse Resp B/P Pulse Ox O2 Delivery O2 Flow Rate FiO2


 


7/11/17 21:46  72 16 118/83  Room Air  











[facility]


Medical Record Reviewed:  Yes


Supervised Visit with YEISON:  No








Calvin Morales Jul 11, 2017 21:57

## 2017-07-12 VITALS
SYSTOLIC BLOOD PRESSURE: 148 MMHG | HEART RATE: 54 BPM | RESPIRATION RATE: 20 BRPM | OXYGEN SATURATION: 96 % | DIASTOLIC BLOOD PRESSURE: 86 MMHG

## 2017-07-12 VITALS
HEART RATE: 54 BPM | RESPIRATION RATE: 16 BRPM | OXYGEN SATURATION: 98 % | SYSTOLIC BLOOD PRESSURE: 145 MMHG | DIASTOLIC BLOOD PRESSURE: 83 MMHG

## 2017-07-12 VITALS
OXYGEN SATURATION: 96 % | SYSTOLIC BLOOD PRESSURE: 148 MMHG | HEART RATE: 54 BPM | RESPIRATION RATE: 20 BRPM | DIASTOLIC BLOOD PRESSURE: 86 MMHG

## 2017-07-12 LAB
ALP SERPL-CCNC: 50 U/L (ref 45–117)
ALT SERPL-CCNC: 26 U/L (ref 12–78)
AMPHETAMINE, URINE: (no result)
ANION GAP SERPL CALC-SCNC: 7 MEQ/L (ref 5–15)
AST SERPL-CCNC: 20 U/L (ref 15–37)
BARBITURATES, URINE: (no result)
BILIRUB SERPL-MCNC: 0.5 MG/DL (ref 0.2–1)
BUN SERPL-MCNC: 20 MG/DL (ref 7–18)
CHLORIDE SERPL-SCNC: 107 MEQ/L (ref 98–107)
COCAINE UR-MCNC: (no result) NG/ML
GFR SERPLBLD BASED ON 1.73 SQ M-ARVRAT: 80 ML/MIN (ref 89–?)
HCO3 BLD-SCNC: 29.1 MEQ/L (ref 21–32)
POTASSIUM SERPL-SCNC: 3.8 MEQ/L (ref 3.5–5.1)
SODIUM SERPL-SCNC: 143 MEQ/L (ref 136–145)

## 2017-07-12 NOTE — PD
HPI


.


Delusions


Chief Complaint:  Psychiatric Symptoms


Time Seen by Provider:  23:07


Travel History


International Travel<30 days:  No


Contact w/Intl Traveler<30days:  No


Traveled to known affect area:  No





History of Present Illness


HPI


This patient presents with a chief complaint of delusions.  He states that they 

started when he was started on Haldol.  He denies any homicidal or suicidal 

ideation.  He states that he is not hearing any voices.  This patient lives in 

an assisted living facility and was dropped off here by that facility.  He has 

no medical complaints.





PFSH


Past Medical History


Arthritis:  Yes (IN LEGS )


Autoimmune Disease:  No


Blood Disorders:  No


Anxiety:  Yes


Depression:  Yes


Cancer:  No


Chemotherapy:  No


Chest Pain:  No


Congestive Heart Failure:  No


COPD:  Yes


Cerebrovascular Accident:  No


Diminished Hearing:  No


Endocrine:  No


Gastrointestinal Disorders:  No


GERD:  No


Genitourinary:  No


Hiatal Hernia:  No


Immune Disorder:  No


Implanted Vascular Access Dvce:  No


Kidney Stones:  No


Musculoskeletal:  Yes


Neurologic:  No


Psychiatric:  Yes


Reproductive:  No


Respiratory:  Yes (COPD)


Immunizations Current:  Yes


Migraines:  No


Radiation Therapy:  No


Renal Failure:  No


Schizophrenia:  Yes


Seizures:  No


Sickle Cell Disease:  No


Thyroid Disease:  No


Ulcer:  No


Tetanus Vaccination:  Unknown


Influenza Vaccination:  No





Past Surgical History


Abdominal Surgery:  Yes (GALL BLADDER REMOVED)


AICD:  No


Arteriovenous Shunt:  No


Cardiac Surgery:  No


Cholecystectomy:  Yes


Ear Surgery:  No


Endocrine Surgery:  No


Eye Surgery:  No


Genitourinary Surgery:  No


Gynecologic Surgery:  No


Insulin Pump:  No


Joint Replacement:  No


Oral Surgery:  No


Pacemaker:  No


Thoracic Surgery:  No


Other Surgery:  Yes





Social History


Alcohol Use:  Yes


Tobacco Use:  Yes


Substance Use:  No (Patient denies. )





Allergies-Medications


(Allergen,Severity, Reaction):  


Coded Allergies:  


     No Known Allergies (Verified , 6/23/17)


 Per MAR faxed by [facility] [phone].


Reported Meds & Prescriptions





Reported Meds & Active Scripts


Active


Trazodone (Trazodone HCl) 100 Mg Tablet 100 Mg PO HS 15 Days


Mirtazapine 15 Mg Tab 15 Mg PO HS 15 Days


Divalproex ER (Divalproex Sodium) 500 Mg Tab 1,000 Mg PO HS 15 Days


Gabapentin 100 Mg Cap 100 Mg PO TID 15 Days


Reported


Haloperidol 5 Mg Tab 5 Mg PO BID


Meloxicam 7.5 Mg Tab 7.5 Mg PO DAILY








Review of Systems


Psychiatric:  Positive: Disorder of Thought





Physical Exam


Narrative


GENERAL: Awake and alert and in no acute distress.


SKIN: Warm and dry.


HEAD: Atraumatic. Normocephalic. 


EYES: Pupils equal and round. 


NECK: Trachea midline.  


CARDIOVASCULAR: Regular rate and rhythm.  


RESPIRATORY: No accessory muscle use. 


MUSCULOSKELETAL: No obvious deformities.   No edema. 


NEUROLOGICAL: Awake and alert. No obvious cranial nerve deficits.  Motor 

grossly within normal limits. Normal speech.


PSYCHIATRIC: Appropriate mood and affect; insight and judgment poor.  The 

patient did not appear to be responding to any internal stimuli during my 

evaluation.





Data


Data


Last Documented VS





Vital Signs








  Date Time  Temp Pulse Resp B/P Pulse Ox O2 Delivery O2 Flow Rate FiO2


 


7/11/17 22:00 98.0    99   


 


7/11/17 21:46  72 16 118/83  Room Air  








Orders





 Complete Blood Count With Diff (7/11/17 23:07)


Comprehensive Metabolic Panel (7/11/17 23:07)


Psych Screen (7/11/17 23:07)


Drug Screen, Random Urine (7/11/17 23:07)


Alcohol (Ethanol) (7/11/17 23:07)





Labs





 Laboratory Tests








Test 7/11/17





 23:25


 


White Blood Count 6.9 TH/MM3


 


Red Blood Count 4.73 MIL/MM3


 


Hemoglobin 14.4 GM/DL


 


Hematocrit 42.6 %


 


Mean Corpuscular Volume 89.9 FL


 


Mean Corpuscular Hemoglobin 30.5 PG


 


Mean Corpuscular Hemoglobin 33.9 %





Concent 


 


Red Cell Distribution Width 15.2 %


 


Platelet Count 210 TH/MM3


 


Mean Platelet Volume 7.7 FL


 


Neutrophils (%) (Auto) 44.6 %


 


Lymphocytes (%) (Auto) 46.4 %


 


Monocytes (%) (Auto) 6.8 %


 


Eosinophils (%) (Auto) 1.8 %


 


Basophils (%) (Auto) 0.4 %


 


Neutrophils # (Auto) 3.1 TH/MM3


 


Lymphocytes # (Auto) 3.2 TH/MM3


 


Monocytes # (Auto) 0.5 TH/MM3


 


Eosinophils # (Auto) 0.1 TH/MM3


 


Basophils # (Auto) 0.0 TH/MM3


 


CBC Comment DIFF FINAL 


 


Differential Comment  


 


Sodium Level 143 MEQ/L


 


Potassium Level 3.8 MEQ/L


 


Chloride Level 107 MEQ/L


 


Carbon Dioxide Level 29.1 MEQ/L


 


Anion Gap 7 MEQ/L


 


Blood Urea Nitrogen 20 MG/DL


 


Creatinine 0.95 MG/DL


 


Estimat Glomerular Filtration 80 ML/MIN





Rate 


 


Random Glucose 73 MG/DL


 


Calcium Level 9.3 MG/DL


 


Total Bilirubin 0.5 MG/DL


 


Aspartate Amino Transf 20 U/L





(AST/SGOT) 


 


Alanine Aminotransferase 26 U/L





(ALT/SGPT) 


 


Alkaline Phosphatase 50 U/L


 


Total Protein 8.2 GM/DL


 


Albumin 3.8 GM/DL


 


Ethyl Alcohol Level LESS THAN 3





 MG/DL











MDM


Medical Decision Making


Medical Screen Exam Complete:  Yes


Emergency Medical Condition:  Yes


Differential Diagnosis


Differential diagnosis of psychosis includes but is not limited to schizophrenia

, schizoaffective disorder, bipolar disorder, intoxication, substance abuse, 

dementia


Narrative Course


This patient presents with chief complaint of delusions.  He will be medically 

cleared and then referred for psychiatric screening.





CBC & BMP Diagram


7/11/17 23:25











Tox screen is negative.





This patient is medically clear for psychiatric evaluation.





Diagnosis





 Primary Impression:  


 Schizophrenia


 Qualified Code:  F20.0 - Paranoid schizophrenia


Condition:  Stable








Barb Gillis MD Jul 12, 2017 00:55

## 2017-07-23 ENCOUNTER — HOSPITAL ENCOUNTER (EMERGENCY)
Dept: HOSPITAL 17 - NEPD | Age: 62
Discharge: NURSING, REHAB OR LONG-TERM CARE | End: 2017-07-23
Payer: MEDICAID

## 2017-07-23 VITALS
HEART RATE: 62 BPM | OXYGEN SATURATION: 98 % | SYSTOLIC BLOOD PRESSURE: 137 MMHG | RESPIRATION RATE: 18 BRPM | TEMPERATURE: 98.1 F | DIASTOLIC BLOOD PRESSURE: 84 MMHG

## 2017-07-23 VITALS
OXYGEN SATURATION: 97 % | HEART RATE: 61 BPM | RESPIRATION RATE: 18 BRPM | SYSTOLIC BLOOD PRESSURE: 154 MMHG | DIASTOLIC BLOOD PRESSURE: 94 MMHG

## 2017-07-23 VITALS — SYSTOLIC BLOOD PRESSURE: 158 MMHG | RESPIRATION RATE: 18 BRPM | DIASTOLIC BLOOD PRESSURE: 90 MMHG

## 2017-07-23 VITALS — RESPIRATION RATE: 18 BRPM | SYSTOLIC BLOOD PRESSURE: 155 MMHG | DIASTOLIC BLOOD PRESSURE: 98 MMHG

## 2017-07-23 VITALS — SYSTOLIC BLOOD PRESSURE: 160 MMHG | DIASTOLIC BLOOD PRESSURE: 80 MMHG | RESPIRATION RATE: 18 BRPM

## 2017-07-23 VITALS — BODY MASS INDEX: 26.12 KG/M2 | WEIGHT: 176.37 LBS | HEIGHT: 69 IN

## 2017-07-23 DIAGNOSIS — E83.51: ICD-10-CM

## 2017-07-23 DIAGNOSIS — R42: Primary | ICD-10-CM

## 2017-07-23 DIAGNOSIS — R94.31: ICD-10-CM

## 2017-07-23 LAB
ANION GAP SERPL CALC-SCNC: 6 MEQ/L (ref 5–15)
APTT BLD: 28.4 SEC (ref 24.3–30.1)
BASOPHILS # BLD AUTO: 0 TH/MM3 (ref 0–0.2)
BASOPHILS NFR BLD: 0.4 % (ref 0–2)
BUN SERPL-MCNC: 19 MG/DL (ref 7–18)
CHLORIDE SERPL-SCNC: 103 MEQ/L (ref 98–107)
CK SERPL-CCNC: 82 U/L (ref 39–308)
COLOR UR: YELLOW
COMMENT (UR): (no result)
CULTURE IF INDICATED: (no result)
EOSINOPHIL # BLD: 0.1 TH/MM3 (ref 0–0.4)
EOSINOPHIL NFR BLD: 2.4 % (ref 0–4)
ERYTHROCYTE [DISTWIDTH] IN BLOOD BY AUTOMATED COUNT: 15.5 % (ref 11.6–17.2)
GFR SERPLBLD BASED ON 1.73 SQ M-ARVRAT: 124 ML/MIN (ref 89–?)
GLUCOSE UR STRIP-MCNC: (no result) MG/DL
HCO3 BLD-SCNC: 30.5 MEQ/L (ref 21–32)
HCT VFR BLD CALC: 40.5 % (ref 39–51)
HEMO FLAGS: (no result)
HGB UR QL STRIP: (no result)
INR PPP: 1.1 RATIO
KETONES UR STRIP-MCNC: (no result) MG/DL
LYMPHOCYTES # BLD AUTO: 2.2 TH/MM3 (ref 1–4.8)
LYMPHOCYTES NFR BLD AUTO: 38.1 % (ref 9–44)
MAGNESIUM SERPL-MCNC: 2 MG/DL (ref 1.5–2.5)
MCH RBC QN AUTO: 31.4 PG (ref 27–34)
MCHC RBC AUTO-ENTMCNC: 34.6 % (ref 32–36)
MCV RBC AUTO: 90.8 FL (ref 80–100)
MONOCYTES NFR BLD: 7 % (ref 0–8)
NEUTROPHILS # BLD AUTO: 3.1 TH/MM3 (ref 1.8–7.7)
NEUTROPHILS NFR BLD AUTO: 52.1 % (ref 16–70)
NITRITE UR QL STRIP: (no result)
PLATELET # BLD: 168 TH/MM3 (ref 150–450)
POTASSIUM SERPL-SCNC: 3.8 MEQ/L (ref 3.5–5.1)
PROTHROMBIN TIME: 11.7 SEC (ref 9.8–11.6)
RBC # BLD AUTO: 4.47 MIL/MM3 (ref 4.5–5.9)
SODIUM SERPL-SCNC: 139 MEQ/L (ref 136–145)
SP GR UR STRIP: 1.01 (ref 1–1.03)
WBC # BLD AUTO: 5.9 TH/MM3 (ref 4–11)

## 2017-07-23 PROCEDURE — 85730 THROMBOPLASTIN TIME PARTIAL: CPT

## 2017-07-23 PROCEDURE — 93005 ELECTROCARDIOGRAM TRACING: CPT

## 2017-07-23 PROCEDURE — 84484 ASSAY OF TROPONIN QUANT: CPT

## 2017-07-23 PROCEDURE — 70450 CT HEAD/BRAIN W/O DYE: CPT

## 2017-07-23 PROCEDURE — 80048 BASIC METABOLIC PNL TOTAL CA: CPT

## 2017-07-23 PROCEDURE — 85025 COMPLETE CBC W/AUTO DIFF WBC: CPT

## 2017-07-23 PROCEDURE — 82550 ASSAY OF CK (CPK): CPT

## 2017-07-23 PROCEDURE — 80164 ASSAY DIPROPYLACETIC ACD TOT: CPT

## 2017-07-23 PROCEDURE — 71010: CPT

## 2017-07-23 PROCEDURE — 85610 PROTHROMBIN TIME: CPT

## 2017-07-23 PROCEDURE — 81001 URINALYSIS AUTO W/SCOPE: CPT

## 2017-07-23 PROCEDURE — 83735 ASSAY OF MAGNESIUM: CPT

## 2017-07-23 NOTE — PD
HPI


Chief Complaint:  Dizziness


Time Seen by Provider:  06:28


Travel History


International Travel<30 days:  No


Contact w/Intl Traveler<30days:  No


Traveled to known affect area:  No





History of Present Illness


HPI


Patient comes in by EMS complaining of dizziness intermittently that began 

around noon yesterday.  Patient's when he feels dizzy his heart feels weak.  

Patient denies any chest pain, shortness of breath, numbness or tingling 

anywhere, fevers, nausea, vomiting, abdominal pain, back pain, or neck pain.  

Patient denies anything making it better or worse.  Denies anything like this 

in the past.  Per EMS patient told them that he needed to come the ER because 

he is afraid that he was going to fall today and wanted to be checked out.  

Patient states he feels like the room is spinning when he gets these dizzy 

spells.





PFSH


Past Medical History


Arthritis:  Yes (IN LEGS )


Autoimmune Disease:  No


Blood Disorders:  No


Anxiety:  Yes


Depression:  Yes


Cancer:  No


Chemotherapy:  No


Chest Pain:  No


Congestive Heart Failure:  No


COPD:  Yes


Cerebrovascular Accident:  No


Diminished Hearing:  No


Endocrine:  No


Gastrointestinal Disorders:  No


GERD:  No


Genitourinary:  No


Hiatal Hernia:  No


Immune Disorder:  No


Implanted Vascular Access Dvce:  No


Kidney Stones:  No


Musculoskeletal:  Yes


Neurologic:  No


Psychiatric:  Yes


Reproductive:  No


Respiratory:  Yes (COPD)


Immunizations Current:  Yes


Migraines:  No


Radiation Therapy:  No


Renal Failure:  No


Schizophrenia:  Yes


Seizures:  No


Sickle Cell Disease:  No


Thyroid Disease:  No


Ulcer:  No





Past Surgical History


Abdominal Surgery:  Yes (GALL BLADDER REMOVED)


AICD:  No


Arteriovenous Shunt:  No


Cardiac Surgery:  No


Cholecystectomy:  Yes


Ear Surgery:  No


Endocrine Surgery:  No


Eye Surgery:  No


Genitourinary Surgery:  No


Gynecologic Surgery:  No


Insulin Pump:  No


Joint Replacement:  No


Oral Surgery:  No


Pacemaker:  No


Thoracic Surgery:  No


Other Surgery:  Yes





Social History


Alcohol Use:  Yes


Tobacco Use:  Yes


Substance Use:  No (Patient denies. )





Allergies-Medications


(Allergen,Severity, Reaction):  


Coded Allergies:  


     No Known Allergies (Verified , 6/23/17)


 Per MAR faxed by [facility] [phone].


Reported Meds & Prescriptions





Reported Meds & Active Scripts


Active


Meclizine (Meclizine HCl) 12.5 Mg Tab 12.5 Mg PO TID PRN


Trazodone (Trazodone HCl) 100 Mg Tablet 100 Mg PO HS 15 Days


Mirtazapine 15 Mg Tab 15 Mg PO HS 15 Days


Divalproex ER (Divalproex Sodium) 500 Mg Tab 1,000 Mg PO HS 15 Days


Gabapentin 100 Mg Cap 100 Mg PO TID 15 Days


Reported


Haloperidol 5 Mg Tab 5 Mg PO BID


Meloxicam 7.5 Mg Tab 7.5 Mg PO DAILY








Review of Systems


Except as stated in HPI:  all other systems reviewed are Neg





Physical Exam


Narrative


GENERAL: Well-developed, well nourished, in no acute distress, and non-ill 

appearing.


SKIN: Focused skin assessment warm and dry.


HEAD: Atraumatic. Normocephalic. 


EYES: Pupils equal and round. EOMI. No scleral icterus. No injection or 

drainage. 


ENT: No nasal bleeding or discharge.  Mucous membranes pink and moist.


NECK: Trachea midline. Supple.  No nuclear rigidity.


CARDIOVASCULAR: Regular rate and rhythm.  No murmur appreciated.


RESPIRATORY: No accessory muscle use.  No respiratory distress. Clear to 

auscultation. Breath sounds equal bilaterally. 


GASTROINTESTINAL: Abdomen soft, non-tender, nondistended, and no guarding. 

Hepatic and splenic margins not palpable. No pulsatile mass.


MUSCULOSKELETAL: No obvious deformities. No clubbing.  No cyanosis.  No edema.  

Full range of motion.


NEUROLOGICAL: Awake and alert. No obvious cranial nerve deficits.  Motor 

grossly within normal limits. Normal speech.


PSYCHIATRIC: Appropriate mood and affect; insight and judgment normal.





Data


Data


Last Documented VS





Vital Signs








  Date Time  Temp Pulse Resp B/P Pulse Ox O2 Delivery O2 Flow Rate FiO2


 


7/23/17 07:15  61 18 154/94 97 Room Air  


 


7/23/17 06:21 98.1       








Orders





 Electrocardiogram (7/23/17 06:26)


Basic Metabolic Panel (Bmp) (7/23/17 06:26)


Complete Blood Count With Diff (7/23/17 06:26)


Magnesium (Mg) (7/23/17 06:26)


Ckmb (Isoenzyme) Profile (7/23/17 06:26)


Troponin I (7/23/17 06:26)


Act Partial Throm Time (Ptt) (7/23/17 06:26)


Prothrombin Time / Inr (Pt) (7/23/17 06:26)


Urinalysis - C+S If Indicated (7/23/17 06:26)


Chest, Single Ap (7/23/17 06:26)


Ct Brain W/O Iv Contrast(Rout) (7/23/17 06:26)


Ecg Monitoring (7/23/17 06:26)


Iv Access Insert/Monitor (7/23/17 06:26)


Oximetry (7/23/17 06:26)


Sodium Chloride 0.9% Flush (Ns Flush) (7/23/17 06:30)


Orthostatic Vital Signs (7/23/17 06:26)


Valproic Acid (Depakene) (7/23/17 06:26)


Meclizine (Antivert) (7/23/17 06:45)


Calcium Carbonate Chew (Tums Chew) (7/23/17 08:00)





Labs








 Laboratory Tests








Test 7/23/17 7/23/17





 07:10 07:20


 


White Blood Count 5.9 TH/MM3 


 


Red Blood Count 4.47 MIL/MM3 


 


Hemoglobin 14.0 GM/DL 


 


Hematocrit 40.5 % 


 


Mean Corpuscular Volume 90.8 FL 


 


Mean Corpuscular Hemoglobin 31.4 PG 


 


Mean Corpuscular Hemoglobin 34.6 % 





Concent  


 


Red Cell Distribution Width 15.5 % 


 


Platelet Count 168 TH/MM3 


 


Mean Platelet Volume 8.3 FL 


 


Neutrophils (%) (Auto) 52.1 % 


 


Lymphocytes (%) (Auto) 38.1 % 


 


Monocytes (%) (Auto) 7.0 % 


 


Eosinophils (%) (Auto) 2.4 % 


 


Basophils (%) (Auto) 0.4 % 


 


Neutrophils # (Auto) 3.1 TH/MM3 


 


Lymphocytes # (Auto) 2.2 TH/MM3 


 


Monocytes # (Auto) 0.4 TH/MM3 


 


Eosinophils # (Auto) 0.1 TH/MM3 


 


Basophils # (Auto) 0.0 TH/MM3 


 


CBC Comment DIFF FINAL  


 


Differential Comment   


 


Prothrombin Time 11.7 SEC 


 


Prothromb Time International 1.1 RATIO 





Ratio  


 


Activated Partial 28.4 SEC 





Thromboplast Time  


 


Sodium Level 139 MEQ/L 


 


Potassium Level 3.8 MEQ/L 


 


Chloride Level 103 MEQ/L 


 


Carbon Dioxide Level 30.5 MEQ/L 


 


Anion Gap 6 MEQ/L 


 


Blood Urea Nitrogen 19 MG/DL 


 


Creatinine 0.65 MG/DL 


 


Estimat Glomerular Filtration 124 ML/MIN 





Rate  


 


Random Glucose 78 MG/DL 


 


Calcium Level 8.2 MG/DL 


 


Magnesium Level 2.0 MG/DL 


 


Total Creatine Kinase 82 U/L 


 


Troponin I LESS THAN 0.02 





 NG/ML 


 


Valproic Acid (Depakene) Level 82 MCG/ML 


 


Urine Color  YELLOW 


 


Urine Turbidity  CLEAR 


 


Urine pH  6.5 


 


Urine Specific Gravity  1.009 


 


Urine Protein  NEG mg/dL


 


Urine Glucose (UA)  NEG mg/dL


 


Urine Ketones  NEG mg/dL


 


Urine Occult Blood  NEG 


 


Urine Nitrite  NEG 


 


Urine Bilirubin  NEG 


 


Urine Urobilinogen  LESS THAN 2.0





  MG/DL


 


Urine Leukocyte Esterase  NEG 


 


Urine RBC  1 /hpf


 


Urine WBC  1 /hpf


 


Microscopic Urinalysis Comment  CULT NOT





  INDICATED














MDM


Medical Decision Making


Medical Screen Exam Complete:  Yes


Emergency Medical Condition:  Yes


Interpretation(s)


EKG was reviewed by Dr. Spicer.  Shows normal sinus rhythm with ventricular 

rate of 63.  No STEMI.


Differential Diagnosis


Acute coronary syndrome, vertigo, anemia, electrolyte abnormality, dehydration, 

UTI, sinusitis, tumor, other


Narrative Course


Patient seen and examined.  Patient was placed on a cardiac monitor IV was 

established.  Initial laboratory and radiological studies were ordered.  

Patient was given a dose of meclizine.  Patient signed out the oncoming 

provider.  Please see their documentation for final diagnosis and disposition.


Scripts


Meclizine 12.5 Mg Tab12.5 Mg PO TID PRN (VERTIGO) #10 TAB  Ref 0


   Prov:Jennifer Gardner          7/23/17








Fidel Crouch Jul 23, 2017 06:30

## 2017-07-23 NOTE — RADRPT
EXAM DATE/TIME:  07/23/2017 06:45 

 

[facility] COMPARISON:     

CT BRAIN W/O CONTRAST, February 15, 2016, 21:26.

 

 

INDICATIONS :     

Dizziness.

                      

 

RADIATION DOSE:     

34.35 CTDIvol (mGy) 

 

 

 

MEDICAL HISTORY :     

Chronic obstructive pulmonary disease.  

 

SURGICAL HISTORY :      

Cholecystectomy. 

 

ENCOUNTER:      

Initial

 

ACUITY:      

1 day

 

PAIN SCALE:      

0/10

 

LOCATION:        

cranial 

 

TECHNIQUE:     

Multiple contiguous axial images were obtained of the head.  Using automated exposure control and adj
ustment of the mA and/or kV according to patient size, radiation dose was kept as low as reasonably a
chievable to obtain optimal diagnostic quality images.   DICOM format image data is available electro
nically for review and comparison.  

 

FINDINGS:     

 

CEREBRUM:     

The ventricles are normal for age.  No evidence of midline shift, mass lesion, hemorrhage or acute in
farction.  No extra-axial fluid collections are seen.

 

POSTERIOR FOSSA:     

The cerebellum and brainstem are intact.  The 4th ventricle is midline.  The cerebellopontine angle i
s unremarkable.

 

EXTRACRANIAL:     

The visualized portion of the orbits is intact.

 

SKULL:     

The calvaria is intact.  No evidence of skull fracture.

 

CONCLUSION:     

Normal examination.  

 

 

 

 Edgardo Aguilar MD on July 23, 2017 at 6:53           

Board Certified Radiologist.

 This report was verified electronically.

## 2017-07-23 NOTE — PD
Physical Exam


Date Seen by Provider:  Jul 23, 2017


Time Seen by Provider:  07:12


Narrative


60 YO M with PMH of schizophrenia presents to the ED for evaluation of 

intermittent dizziness.


The patient was initially seen by RADHA Patel and signed out to me with 

lab work pending.


Please see his note for full H&P.





The patient was administered meclizine by previous provider. On recheck the 

patient states that his dizziness has resolved.


On my exam:





GENERAL: Well-nourished, well-developed white male in no acute distress.


SKIN: Focused skin assessment warm/dry.


HEAD: Normocephalic.


EYES: No scleral icterus. No injection or drainage. 


NECK: Supple, trachea midline. No JVD or lymphadenopathy.


CARDIOVASCULAR: Regular rate and rhythm without murmurs, gallops, or rubs. 


RESPIRATORY: Breath sounds clear and equal bilaterally. No accessory muscle use.


GASTROINTESTINAL: Abdomen soft, non-tender, nondistended.  Active bowel sounds


MUSCULOSKELETAL: No cyanosis, or edema.  Moves extremities spontaneously.


NEUROLOGICAL: Awake and alert. Cranial nerves II through XII intact.  Motor and 

sensory grossly within normal limits.5/5 muscle strength in all muscle groups.  

Normal speech.  No pronator drift.


BACK: Nontender without obvious deformity. No CVA tenderness.





Data


Data


Last Documented VS





Vital Signs








  Date Time  Temp Pulse Resp B/P Pulse Ox O2 Delivery O2 Flow Rate FiO2


 


7/23/17 07:15  61 18 154/94 97 Room Air  


 


7/23/17 06:21 98.1       








Orders





 Electrocardiogram (7/23/17 06:26)


Basic Metabolic Panel (Bmp) (7/23/17 06:26)


Complete Blood Count With Diff (7/23/17 06:26)


Magnesium (Mg) (7/23/17 06:26)


Ckmb (Isoenzyme) Profile (7/23/17 06:26)


Troponin I (7/23/17 06:26)


Act Partial Throm Time (Ptt) (7/23/17 06:26)


Prothrombin Time / Inr (Pt) (7/23/17 06:26)


Urinalysis - C+S If Indicated (7/23/17 06:26)


Chest, Single Ap (7/23/17 06:26)


Ct Brain W/O Iv Contrast(Rout) (7/23/17 06:26)


Ecg Monitoring (7/23/17 06:26)


Iv Access Insert/Monitor (7/23/17 06:26)


Oximetry (7/23/17 06:26)


Sodium Chloride 0.9% Flush (Ns Flush) (7/23/17 06:30)


Orthostatic Vital Signs (7/23/17 06:26)


Valproic Acid (Depakene) (7/23/17 06:26)


Meclizine (Antivert) (7/23/17 06:45)


Calcium Carbonate Chew (Tums Chew) (7/23/17 08:00)





Labs





 Laboratory Tests








Test 7/23/17 7/23/17





 07:10 07:20


 


White Blood Count 5.9 TH/MM3 


 


Red Blood Count 4.47 MIL/MM3 


 


Hemoglobin 14.0 GM/DL 


 


Hematocrit 40.5 % 


 


Mean Corpuscular Volume 90.8 FL 


 


Mean Corpuscular Hemoglobin 31.4 PG 


 


Mean Corpuscular Hemoglobin 34.6 % 





Concent  


 


Red Cell Distribution Width 15.5 % 


 


Platelet Count 168 TH/MM3 


 


Mean Platelet Volume 8.3 FL 


 


Neutrophils (%) (Auto) 52.1 % 


 


Lymphocytes (%) (Auto) 38.1 % 


 


Monocytes (%) (Auto) 7.0 % 


 


Eosinophils (%) (Auto) 2.4 % 


 


Basophils (%) (Auto) 0.4 % 


 


Neutrophils # (Auto) 3.1 TH/MM3 


 


Lymphocytes # (Auto) 2.2 TH/MM3 


 


Monocytes # (Auto) 0.4 TH/MM3 


 


Eosinophils # (Auto) 0.1 TH/MM3 


 


Basophils # (Auto) 0.0 TH/MM3 


 


CBC Comment DIFF FINAL  


 


Differential Comment   


 


Prothrombin Time 11.7 SEC 


 


Prothromb Time International 1.1 RATIO 





Ratio  


 


Activated Partial 28.4 SEC 





Thromboplast Time  


 


Sodium Level 139 MEQ/L 


 


Potassium Level 3.8 MEQ/L 


 


Chloride Level 103 MEQ/L 


 


Carbon Dioxide Level 30.5 MEQ/L 


 


Anion Gap 6 MEQ/L 


 


Blood Urea Nitrogen 19 MG/DL 


 


Creatinine 0.65 MG/DL 


 


Estimat Glomerular Filtration 124 ML/MIN 





Rate  


 


Random Glucose 78 MG/DL 


 


Calcium Level 8.2 MG/DL 


 


Magnesium Level 2.0 MG/DL 


 


Total Creatine Kinase 82 U/L 


 


Troponin I LESS THAN 0.02 





 NG/ML 


 


Valproic Acid (Depakene) Level 82 MCG/ML 


 


Urine Color  YELLOW 


 


Urine Turbidity  CLEAR 


 


Urine pH  6.5 


 


Urine Specific Gravity  1.009 


 


Urine Protein  NEG mg/dL


 


Urine Glucose (UA)  NEG mg/dL


 


Urine Ketones  NEG mg/dL


 


Urine Occult Blood  NEG 


 


Urine Nitrite  NEG 


 


Urine Bilirubin  NEG 


 


Urine Urobilinogen  LESS THAN 2.0





  MG/DL


 


Urine Leukocyte Esterase  NEG 


 


Urine RBC  1 /hpf


 


Urine WBC  1 /hpf


 


Microscopic Urinalysis Comment  CULT NOT





  INDICATED











MDM


Medical Record Reviewed:  Yes


Supervised Visit with YEISON:  No


Differential Diagnosis


sinusitis versus ACS versus vertigo versus electrolyte abnormality versus brain 

lesion versus ICH versus ACS versus other


Narrative Course


51-year-old male with history of schizophrenia presents to the ED for 

evaluation of intermittent dizziness.  Patient was initially seen by RADHA Evans and signed out to me with lab work pending.  No focal neuro deficits on 

physical exam.  Patient reports a solution of this dizziness on my check.





Chest x-ray and EKG (reviewed by Dr. Spicer) unremarkable.


Cardiac enzymes: negative x 1


CT of the head negative per radiology read.  


CBC: no concerning abnormalities.


CMP: hypocalcemia of 8.3.


Coags: INR 1.1


Valproic acid: 82


UA: no culture indicated.





Patient was administered 1g calcium chew. Prescribed a few doses of meclizine 3 

times a day, when necessary for dizziness. He is instructed to follow up with 

his PCP this week. He is stable and discharged to his SNF.


Diagnosis





 Primary Impression:  


 Dizziness


 Additional Impression:  


 Hypocalcemia


Referrals:  


Primary Care Physician


Patient Instructions:  Dizziness (ED), General Instructions, Hypocalcemia (ED)





***Additional Instruction:


Rest, hydrate.


Resume outpatient medications.


Take meclizine as needed for continued dizziness.


Follow up with the primary care provider this week.


Return to the ED for any urgent or emergent medical condition.


Scripts


Meclizine 12.5 Mg Tab12.5 Mg PO TID PRN (VERTIGO) #10 TAB  Ref 0


   Prov:Jennifer Gardner DO         7/23/17


Disposition:  03 DISCHARGE TO SNF


Condition:  Stable








Lorraine Hu Jul 23, 2017 07:15

## 2017-07-23 NOTE — RADRPT
EXAM DATE/TIME:  07/23/2017 06:40 

 

[facility] COMPARISON:     

No previous studies available for comparison.

 

                     

INDICATIONS :     

Chest pain.

                     

 

MEDICAL HISTORY :     

None.          

 

SURGICAL HISTORY :     

None.   

 

ENCOUNTER:     

Initial                                        

 

ACUITY:     

1 day      

 

PAIN SCORE:     

6/10

 

LOCATION:     

Bilateral chest 

 

FINDINGS:     

A single view of the chest demonstrates the lungs to be symmetrically aerated without evidence of mas
s, infiltrate or effusion.  The cardiomediastinal contours are unremarkable.  Osseous structures are 
intact.

 

CONCLUSION:     No acute disease.  

 

 

 

 Edgardo Aguilar MD on July 23, 2017 at 6:49           

Board Certified Radiologist.

 This report was verified electronically.

## 2017-07-24 NOTE — EKG
Date Performed: 07/23/2017       Time Performed: 06:29:46

 

PTAGE:      61 years

 

EKG:      Sinus rhythm 

 

 SEPTAL MYOCARDIAL INFARCTION ABNORMAL ECG

 

PREVIOUS TRACING       : 06/09/2015 15.16 Now consider anteroseptal myocardial infarction - age indet
erminate

 

DOCTOR:   Luis Rivers  Interpretating Date/Time  07/24/2017 13:56:55

## 2017-08-01 ENCOUNTER — HOSPITAL ENCOUNTER (INPATIENT)
Dept: HOSPITAL 17 - NEPJ | Age: 62
LOS: 9 days | Discharge: NURSING, REHAB OR LONG-TERM CARE | DRG: 885 | End: 2017-08-10
Attending: PSYCHIATRY & NEUROLOGY | Admitting: PSYCHIATRY & NEUROLOGY
Payer: MEDICAID

## 2017-08-01 VITALS
OXYGEN SATURATION: 97 % | HEART RATE: 60 BPM | SYSTOLIC BLOOD PRESSURE: 119 MMHG | DIASTOLIC BLOOD PRESSURE: 70 MMHG | RESPIRATION RATE: 18 BRPM

## 2017-08-01 VITALS
HEART RATE: 61 BPM | DIASTOLIC BLOOD PRESSURE: 95 MMHG | TEMPERATURE: 97.5 F | SYSTOLIC BLOOD PRESSURE: 176 MMHG | RESPIRATION RATE: 18 BRPM

## 2017-08-01 VITALS
SYSTOLIC BLOOD PRESSURE: 138 MMHG | DIASTOLIC BLOOD PRESSURE: 87 MMHG | TEMPERATURE: 98.7 F | RESPIRATION RATE: 22 BRPM | HEART RATE: 78 BPM | OXYGEN SATURATION: 99 %

## 2017-08-01 VITALS — HEIGHT: 68 IN | BODY MASS INDEX: 24.86 KG/M2 | WEIGHT: 164.02 LBS

## 2017-08-01 DIAGNOSIS — F41.9: ICD-10-CM

## 2017-08-01 DIAGNOSIS — J44.9: ICD-10-CM

## 2017-08-01 DIAGNOSIS — F32.9: ICD-10-CM

## 2017-08-01 DIAGNOSIS — F12.90: ICD-10-CM

## 2017-08-01 DIAGNOSIS — I10: ICD-10-CM

## 2017-08-01 DIAGNOSIS — F17.210: ICD-10-CM

## 2017-08-01 DIAGNOSIS — R45.851: ICD-10-CM

## 2017-08-01 DIAGNOSIS — F20.0: Primary | ICD-10-CM

## 2017-08-01 LAB
AMPHETAMINE, URINE: (no result)
ANION GAP SERPL CALC-SCNC: 5 MEQ/L (ref 5–15)
BARBITURATES, URINE: (no result)
BASOPHILS # BLD AUTO: 0 TH/MM3 (ref 0–0.2)
BASOPHILS NFR BLD: 0.5 % (ref 0–2)
BUN SERPL-MCNC: 17 MG/DL (ref 7–18)
CHLORIDE SERPL-SCNC: 103 MEQ/L (ref 98–107)
COCAINE UR-MCNC: (no result) NG/ML
COLOR UR: YELLOW
COMMENT (UR): (no result)
CULTURE IF INDICATED: (no result)
EOSINOPHIL # BLD: 0.2 TH/MM3 (ref 0–0.4)
EOSINOPHIL NFR BLD: 2.8 % (ref 0–4)
ERYTHROCYTE [DISTWIDTH] IN BLOOD BY AUTOMATED COUNT: 15.2 % (ref 11.6–17.2)
GFR SERPLBLD BASED ON 1.73 SQ M-ARVRAT: 99 ML/MIN (ref 89–?)
GLUCOSE UR STRIP-MCNC: (no result) MG/DL
HCO3 BLD-SCNC: 30.9 MEQ/L (ref 21–32)
HCT VFR BLD CALC: 44.3 % (ref 39–51)
HEMO FLAGS: (no result)
HGB UR QL STRIP: (no result)
KETONES UR STRIP-MCNC: (no result) MG/DL
LYMPHOCYTES # BLD AUTO: 2.2 TH/MM3 (ref 1–4.8)
LYMPHOCYTES NFR BLD AUTO: 40.1 % (ref 9–44)
MCH RBC QN AUTO: 30.5 PG (ref 27–34)
MCHC RBC AUTO-ENTMCNC: 33.7 % (ref 32–36)
MCV RBC AUTO: 90.6 FL (ref 80–100)
MONOCYTES NFR BLD: 9 % (ref 0–8)
NEUTROPHILS # BLD AUTO: 2.7 TH/MM3 (ref 1.8–7.7)
NEUTROPHILS NFR BLD AUTO: 47.6 % (ref 16–70)
NITRITE UR QL STRIP: (no result)
PLATELET # BLD: 193 TH/MM3 (ref 150–450)
POTASSIUM SERPL-SCNC: 3.9 MEQ/L (ref 3.5–5.1)
RBC # BLD AUTO: 4.89 MIL/MM3 (ref 4.5–5.9)
SODIUM SERPL-SCNC: 139 MEQ/L (ref 136–145)
SP GR UR STRIP: 1.01 (ref 1–1.03)
SQUAMOUS #/AREA URNS HPF: <1 /HPF (ref 0–5)
WBC # BLD AUTO: 5.6 TH/MM3 (ref 4–11)

## 2017-08-01 PROCEDURE — 85025 COMPLETE CBC W/AUTO DIFF WBC: CPT

## 2017-08-01 PROCEDURE — 80164 ASSAY DIPROPYLACETIC ACD TOT: CPT

## 2017-08-01 PROCEDURE — 80061 LIPID PANEL: CPT

## 2017-08-01 PROCEDURE — 93005 ELECTROCARDIOGRAM TRACING: CPT

## 2017-08-01 PROCEDURE — 80048 BASIC METABOLIC PNL TOTAL CA: CPT

## 2017-08-01 PROCEDURE — 80053 COMPREHEN METABOLIC PANEL: CPT

## 2017-08-01 PROCEDURE — 83036 HEMOGLOBIN GLYCOSYLATED A1C: CPT

## 2017-08-01 PROCEDURE — 80307 DRUG TEST PRSMV CHEM ANLYZR: CPT

## 2017-08-01 PROCEDURE — 81001 URINALYSIS AUTO W/SCOPE: CPT

## 2017-08-01 RX ADMIN — GABAPENTIN SCH MG: 100 CAPSULE ORAL at 18:00

## 2017-08-01 RX ADMIN — DIVALPROEX SODIUM SCH MG: 500 TABLET, EXTENDED RELEASE ORAL at 20:32

## 2017-08-01 RX ADMIN — MIRTAZAPINE SCH MG: 15 TABLET, FILM COATED ORAL at 20:32

## 2017-08-01 RX ADMIN — TRIHEXYPHENIDYL HYDROCHLORIDE SCH MG: 2 TABLET ORAL at 20:31

## 2017-08-01 RX ADMIN — HALOPERIDOL SCH MG: 5 TABLET ORAL at 20:32

## 2017-08-01 NOTE — PD
HPI


Chief Complaint:  Psychiatric Symptoms


Time Seen by Provider:  15:25


Travel History


International Travel<30 days:  No


Contact w/Intl Traveler<30days:  No


Traveled to known affect area:  No





History of Present Illness


HPI


61-year-old male that presents to the ED for evaluation of psych.  Patient has 

a chronic history of schizophrenia.  Patient is paranoid.  Patient apparently 

is hearing voices and his medications were recently changed and he believes 

that the need to be switched again as he is hearing a lot of voices and he 

feels not well.  He denies any suicidal or homicidal ideation but apparently 

the voices are telling him to possibly hurt someone.  She denies any pain.  No 

chest pain or shortness of breath.  No other medical issues.  Symptoms have 

been ongoing for the past couple days.





PFSH


Past Medical History


Arthritis:  Yes (IN LEGS )


Autoimmune Disease:  No


Blood Disorders:  No


Anxiety:  Yes


Depression:  Yes


Cancer:  No


Chemotherapy:  No


Chest Pain:  No


Congestive Heart Failure:  No


COPD:  Yes


Cerebrovascular Accident:  No


Diminished Hearing:  No


Endocrine:  No


Gastrointestinal Disorders:  No


GERD:  No


Genitourinary:  No


Hiatal Hernia:  No


Immune Disorder:  No


Implanted Vascular Access Dvce:  No


Kidney Stones:  No


Musculoskeletal:  Yes


Neurologic:  No


Psychiatric:  Yes


Reproductive:  No


Respiratory:  Yes (COPD)


Immunizations Current:  Yes


Migraines:  No


Radiation Therapy:  No


Renal Failure:  No


Schizophrenia:  Yes


Seizures:  No


Sickle Cell Disease:  No


Thyroid Disease:  No


Ulcer:  No





Past Surgical History


Abdominal Surgery:  Yes (GALL BLADDER REMOVED)


AICD:  No


Arteriovenous Shunt:  No


Cardiac Surgery:  No


Cholecystectomy:  Yes


Ear Surgery:  No


Endocrine Surgery:  No


Eye Surgery:  No


Genitourinary Surgery:  No


Gynecologic Surgery:  No


Insulin Pump:  No


Joint Replacement:  No


Oral Surgery:  No


Pacemaker:  No


Thoracic Surgery:  No


Other Surgery:  Yes





Social History


Alcohol Use:  Yes (occasionally)


Tobacco Use:  Yes


Substance Use:  No (Patient denies. )





Allergies-Medications


(Allergen,Severity, Reaction):  


Coded Allergies:  


     No Known Allergies (Verified , 6/23/17)


 Per MAR faxed by [employer] [phone].


Reported Meds & Prescriptions





Reported Meds & Active Scripts


Active


Meclizine (Meclizine HCl) 12.5 Mg Tab 12.5 Mg PO TID PRN


Trazodone (Trazodone HCl) 100 Mg Tablet 100 Mg PO HS 15 Days


Mirtazapine 15 Mg Tab 15 Mg PO HS 15 Days


Divalproex ER (Divalproex Sodium) 500 Mg Tab 1,000 Mg PO HS 15 Days


Gabapentin 100 Mg Cap 100 Mg PO TID 15 Days


Reported


Abilify Maintena Dual Chamber Inj (Aripiprazole) 400 Mg Inj 400 Mg IM Q28D


Trihexyphenidyl (Trihexyphenidyl HCl) 2 Mg Tab 2 Mg PO BID


Haloperidol 5 Mg Tab 5 Mg PO BID


Meloxicam 7.5 Mg Tab 7.5 Mg PO DAILY








Review of Systems


Except as stated in HPI:  all other systems reviewed are Neg





Physical Exam


Narrative


GENERAL: 


SKIN: Warm and dry.


HEAD: Atraumatic. Normocephalic. 


EYES: Pupils equal and round. No scleral icterus. No injection or drainage. 


ENT: No nasal bleeding or discharge.  Mucous membranes pink and moist.  Tongue 

is midline.  No uvula deviation.


NECK: Trachea midline. No JVD. 


CARDIOVASCULAR: Regular rate and rhythm.  No murmurs, S3, S4.


RESPIRATORY: No accessory muscle use. Clear to auscultation. Breath sounds 

equal bilaterally. 


GASTROINTESTINAL: Abdomen soft, non-tender, nondistended. Hepatic and splenic 

margins not palpable. 


MUSCULOSKELETAL: Extremities without clubbing, cyanosis, or edema. No obvious 

deformities.  Full range of motion of the upper and lower extremities 

bilaterally.  2+ pulses bilaterally.


NEUROLOGICAL: Awake and alert. No obvious cranial nerve deficits.  Motor 

grossly within normal limits. Five out of 5 muscle strength in the arms and 

legs.  Normal speech.


PSYCHIATRIC: Somewhat psychotic mood and affect; insight and judgment normal.





Data


Data


Last Documented VS





Vital Signs








  Date Time  Temp Pulse Resp B/P Pulse Ox O2 Delivery O2 Flow Rate FiO2


 


8/1/17 14:14  60 18 119/70 97 Room Air  


 


8/1/17 12:14 98.7       








Orders





 Complete Blood Count With Diff (8/1/17 12:20)


Basic Metabolic Panel (Bmp) (8/1/17 12:20)


Urinalysis - C+S If Indicated (8/1/17 12:20)


Drug Screen, Random Urine (8/1/17 12:20)





Labs





 Laboratory Tests








Test 8/1/17 8/1/17





 12:27 12:45


 


White Blood Count 5.6 TH/MM3 


 


Red Blood Count 4.89 MIL/MM3 


 


Hemoglobin 14.9 GM/DL 


 


Hematocrit 44.3 % 


 


Mean Corpuscular Volume 90.6 FL 


 


Mean Corpuscular Hemoglobin 30.5 PG 


 


Mean Corpuscular Hemoglobin 33.7 % 





Concent  


 


Red Cell Distribution Width 15.2 % 


 


Platelet Count 193 TH/MM3 


 


Mean Platelet Volume 7.7 FL 


 


Neutrophils (%) (Auto) 47.6 % 


 


Lymphocytes (%) (Auto) 40.1 % 


 


Monocytes (%) (Auto) 9.0 % 


 


Eosinophils (%) (Auto) 2.8 % 


 


Basophils (%) (Auto) 0.5 % 


 


Neutrophils # (Auto) 2.7 TH/MM3 


 


Lymphocytes # (Auto) 2.2 TH/MM3 


 


Monocytes # (Auto) 0.5 TH/MM3 


 


Eosinophils # (Auto) 0.2 TH/MM3 


 


Basophils # (Auto) 0.0 TH/MM3 


 


CBC Comment DIFF FINAL  


 


Differential Comment   


 


Sodium Level 139 MEQ/L 


 


Potassium Level 3.9 MEQ/L 


 


Chloride Level 103 MEQ/L 


 


Carbon Dioxide Level 30.9 MEQ/L 


 


Anion Gap 5 MEQ/L 


 


Blood Urea Nitrogen 17 MG/DL 


 


Creatinine 0.79 MG/DL 


 


Estimat Glomerular Filtration 99 ML/MIN 





Rate  


 


Random Glucose 81 MG/DL 


 


Calcium Level 8.8 MG/DL 


 


Urine Color  YELLOW 


 


Urine Turbidity  CLEAR 


 


Urine pH  6.5 


 


Urine Specific Gravity  1.014 


 


Urine Protein  NEG mg/dL


 


Urine Glucose (UA)  NEG mg/dL


 


Urine Ketones  NEG mg/dL


 


Urine Occult Blood  NEG 


 


Urine Nitrite  NEG 


 


Urine Bilirubin  NEG 


 


Urine Urobilinogen  4.0 MG/DL


 


Urine Leukocyte Esterase  NEG 


 


Urine RBC  1 /hpf


 


Urine WBC  LESS THAN 1





  /hpf


 


Urine Squamous Epithelial  <1 /hpf





Cells  


 


Microscopic Urinalysis Comment  CULT NOT





  INDICATED


 


Urine Opiates Screen  NEG 


 


Urine Barbiturates Screen  NEG 


 


Urine Amphetamines Screen  NEG 


 


Urine Benzodiazepines Screen  NEG 


 


Urine Cocaine Screen  NEG 


 


Urine Cannabinoids Screen  POS 











[facility]


Medical Decision Making


Medical Screen Exam Complete:  Yes


Emergency Medical Condition:  Yes


Medical Record Reviewed:  Yes


Interpretation(s)


CBC & BMP Diagram


8/1/17 12:27








tox possitive for cannabinoids


Differential Diagnosis


Depression versus suicidal ideation versus anxiety versus adjustment disorder 

versus mood disorder versus bipolar disorder versus schizophrenia versus 

paranoid disorder versus psychosis versus substance abuse versus alcohol abuse 

versus alcohol induced psychosis versus homicidality addition versus cutting 

versus personality disorder


Narrative Course


61-year-old male that presents to the ED for evaluation of schizophrenia.  

Patient was properly examined and was found to have signs and symptoms 

consistent with schizophrenia.  No sign of acute medical distress.  Labs were 

done.  Patient was medically clear.  Okay to be seen by psych.


Mental health screening was discussed with the patient.





Diagnosis





 Primary Impression:  


 Schizophrenia


 Qualified Code:  F20.9 - Schizophrenia, unspecified type








Calvin Morales Aug 1, 2017 15:29

## 2017-08-01 NOTE — PD
__________________________________________________





History of Present Illness


Chief Complaint:  Psychiatric Symptoms


Time Seen by Provider:  16:00


Travel History


International Travel<30 Days:  No


Contact w/Intl Traveler<30days:  No


Known affected area:  No





Legal Status


Legal Status:  Voluntary


History of Present Illness:


History of Present Illness


HPI


61-year-old male  with history of schizophrenia, known to [facility], with at least 6 

psychiatric hospitalizations  since 2010 who presents to the ED for psychiatric 

evaluation with complaints of increase in auditory hallucinations, increase in 

delusions secondary to  recent change in medications. He denies any suicidal or 

homicidal ideation but apparently the voices are telling him to possibly hurt 

someone. He reports increase in symptoms for the past couple days. Reports 

medication compliance.





EMR is reviewed. Last psychiatric hospitalization was in June 2017 under the 

care of Dr. Dowell.  He presented to ED on July 11, 2017 on a voluntary  

status reporting increase in  symptoms and was referred to [facility] outpatient 

clinic for medication  evaluation. 


Patient is seen in J pod. asleep but arouses easily. His speech is low tone and 

difficult at times to understand. He reports increase in auditory 

hallucinations  as well as increase in paranoid thoughts. He does nto disclose 

the  content of his hallucinations. He again states that he feels his 

medications are not working and  that he needs to have ' a medication  change".

  Patient  denies any suicidal or homicidal ideation.





PFSH


Past Medical History


Arthritis:  Yes (IN LEGS )


Autoimmune Disease:  No


Blood Disorders:  No


Anxiety:  Yes


Depression:  Yes


Cancer:  No


Chemotherapy:  No


Chest Pain:  No


Congestive Heart Failure:  No


COPD:  Yes


Cerebrovascular Accident:  No


Diminished Hearing:  No


Endocrine:  No


Gastrointestinal Disorders:  No


GERD:  No


Genitourinary:  No


Hiatal Hernia:  No


Immune Disorder:  No


Implanted Vascular Access Dvce:  No


Kidney Stones:  No


Musculoskeletal:  Yes


Neurologic:  No


Psychiatric:  Yes


Reproductive:  No


Respiratory:  Yes (COPD)


Immunizations Current:  Yes


Migraines:  No


Radiation Therapy:  No


Renal Failure:  No


Schizophrenia:  Yes


Seizures:  No


Sickle Cell Disease:  No


Thyroid Disease:  No


Ulcer:  No





Past Surgical History


Abdominal Surgery:  Yes (GALL BLADDER REMOVED)


AICD:  No


Arteriovenous Shunt:  No


Cardiac Surgery:  No


Cholecystectomy:  Yes


Ear Surgery:  No


Endocrine Surgery:  No


Eye Surgery:  No


Genitourinary Surgery:  No


Gynecologic Surgery:  No


Insulin Pump:  No


Joint Replacement:  No


Oral Surgery:  No


Pacemaker:  No


Thoracic Surgery:  No


Other Surgery:  Yes





Psychiatric History


Psychiatric History


Hx Psychiatric Treatment:  


Pt. has had prior admissions to [facility] and also notes that he has been to





a rehab center in [facility] years ago.  Last psych admission to [facility] in June 2017.


History of Inpatient Treatment:  Yes


Guns or firearms in home:  No





Social History


Single male. Lives at [facility]


Hx Alcohol Use:  Yes (occasionally)


Hx Tobacco Use:  Yes


Hx Substance Use:  No (Patient denies. )


Substance Use Type:  Marijuana, Nicotine/Cigarettes


Other Substances Used:  in the past-denies now


Hx of Substance Use Treatment:  Yes





Family Psychiatric History


None reported





Allergies-Medications


(Allergen,Severity, Reaction):  


Coded Allergies:  


     No Known Allergies (Verified , 6/23/17)


 Per MAR faxed by [employer] [phone].


Reported Meds & Prescriptions





Reported Meds & Active Scripts


Active


Meclizine (Meclizine HCl) 12.5 Mg Tab 12.5 Mg PO TID PRN


Trazodone (Trazodone HCl) 100 Mg Tablet 100 Mg PO HS 15 Days


Mirtazapine 15 Mg Tab 15 Mg PO HS 15 Days


Divalproex ER (Divalproex Sodium) 500 Mg Tab 1,000 Mg PO HS 15 Days


Gabapentin 100 Mg Cap 100 Mg PO TID 15 Days


Reported


Abilify Maintena Dual Chamber Inj (Aripiprazole) 400 Mg Inj 400 Mg IM Q28D


Trihexyphenidyl (Trihexyphenidyl HCl) 2 Mg Tab 2 Mg PO BID


Haloperidol 5 Mg Tab 5 Mg PO BID


Meloxicam 7.5 Mg Tab 7.5 Mg PO DAILY





Review of Systems


Except as stated in HPI:  all other systems reviewed are Neg





Exam


Alert:  Yes


[place]:  Person (ox4)


Mood:  Calm


Affect:  Restricted


Speech:  Clear (diminished. )


Eye Contact:  Other (decreased)


Memory Intact:  Comment (Not  formally tested)


Hallucinations:  Auditory


Delusions:  Yes (Does not disclose)


Delusion Type:  Paranoid (Has reported feeling paranoid)


Suicidal:  Ideation (Denies any)


Homicidal:  Ideation (Denies )


Insight/Judgement


Fair. Not impaired





[facility]


Medical Decision Making


Medical Record Reviewed:  Yes


Assessment/Plan


61-year-old male  with history of schizophrenia, known to [facility], with at least 6 

psychiatric hospitalizations  since 2010 who presents to the ED for the second 

time in 2 weeks   with complaints of increase in auditory hallucinations, 

increase in delusions secondary to  recent change in medications. He denies any 

suicidal or homicidal ideation but apparently the voices are telling him to 

possibly hurt someone. He reports increase in symptoms for the past couple 

days. At this time he will be admitted for  further evaluation as well as 

medication adjustment.





Orders





 Complete Blood Count With Diff (8/1/17 12:20)


Basic Metabolic Panel (Bmp) (8/1/17 12:20)


Urinalysis - C+S If Indicated (8/1/17 12:20)


Drug Screen, Random Urine (8/1/17 12:20)


Psych Screen (8/1/17 15:35)





Results





Vital Signs








  Date Time  Temp Pulse Resp B/P Pulse Ox O2 Delivery O2 Flow Rate FiO2


 


8/1/17 14:14  60 18 119/70 97 Room Air  


 


8/1/17 12:14 98.7 78 22 138/87 99   











Laboratory Tests








Test 8/1/17 8/1/17





 12:27 12:45


 


White Blood Count 5.6  


 


Red Blood Count 4.89  


 


Hemoglobin 14.9  


 


Hematocrit 44.3  


 


Mean Corpuscular Volume 90.6  


 


Mean Corpuscular Hemoglobin 30.5  


 


Mean Corpuscular Hemoglobin 33.7  





Concent  


 


Red Cell Distribution Width 15.2  


 


Platelet Count 193  


 


Mean Platelet Volume 7.7  


 


Neutrophils (%) (Auto) 47.6  


 


Lymphocytes (%) (Auto) 40.1  


 


Monocytes (%) (Auto) 9.0  


 


Eosinophils (%) (Auto) 2.8  


 


Basophils (%) (Auto) 0.5  


 


Neutrophils # (Auto) 2.7  


 


Lymphocytes # (Auto) 2.2  


 


Monocytes # (Auto) 0.5  


 


Eosinophils # (Auto) 0.2  


 


Basophils # (Auto) 0.0  


 


CBC Comment DIFF FINAL  


 


Differential Comment   


 


Sodium Level 139  


 


Potassium Level 3.9  


 


Chloride Level 103  


 


Carbon Dioxide Level 30.9  


 


Anion Gap 5  


 


Blood Urea Nitrogen 17  


 


Creatinine 0.79  


 


Estimat Glomerular Filtration 99  





Rate  


 


Random Glucose 81  


 


Calcium Level 8.8  


 


Urine Color  YELLOW 


 


Urine Turbidity  CLEAR 


 


Urine pH  6.5 


 


Urine Specific Gravity  1.014 


 


Urine Protein  NEG 


 


Urine Glucose (UA)  NEG 


 


Urine Ketones  NEG 


 


Urine Occult Blood  NEG 


 


Urine Nitrite  NEG 


 


Urine Bilirubin  NEG 


 


Urine Urobilinogen  4.0 


 


Urine Leukocyte Esterase  NEG 


 


Urine RBC  1 


 


Urine WBC  LESS THAN 1 


 


Urine Squamous Epithelial  <1 





Cells  


 


Microscopic Urinalysis Comment  CULT NOT





  INDICATED


 


Urine Opiates Screen  NEG 


 


Urine Barbiturates Screen  NEG 


 


Urine Amphetamines Screen  NEG 


 


Urine Benzodiazepines Screen  NEG 


 


Urine Cocaine Screen  NEG 


 


Urine Cannabinoids Screen  POS 











Diagnosis





 Primary Impression:  


 Schizophrenia





Admitting Information


Admitting Physician Requests:  Admit





Problem Qualifiers








 Primary Impression:  


 Schizophrenia


 Qualified Code:  F20.0 - Paranoid schizophrenia





Tracey Sloan [facility] Aug 1, 2017 16:32

## 2017-08-02 VITALS
DIASTOLIC BLOOD PRESSURE: 73 MMHG | SYSTOLIC BLOOD PRESSURE: 132 MMHG | RESPIRATION RATE: 18 BRPM | HEART RATE: 58 BPM | OXYGEN SATURATION: 98 % | TEMPERATURE: 97.9 F

## 2017-08-02 VITALS
RESPIRATION RATE: 18 BRPM | DIASTOLIC BLOOD PRESSURE: 59 MMHG | SYSTOLIC BLOOD PRESSURE: 101 MMHG | HEART RATE: 60 BPM | TEMPERATURE: 99.3 F

## 2017-08-02 LAB
ANION GAP SERPL CALC-SCNC: 6 MEQ/L (ref 5–15)
BUN SERPL-MCNC: 23 MG/DL (ref 7–18)
CHLORIDE SERPL-SCNC: 108 MEQ/L (ref 98–107)
GFR SERPLBLD BASED ON 1.73 SQ M-ARVRAT: 98 ML/MIN (ref 89–?)
HCO3 BLD-SCNC: 31 MEQ/L (ref 21–32)
HDLC SERPL-MCNC: 39.8 MG/DL (ref 40–60)
HEMOGLOBIN A1A: 0.9 %
HEMOGLOBIN A1B: 0.7 %
HEMOGLOBIN AO: 86.3 %
HEMOGLOBIN LA1C: 1.9 %
HEMOGLOBIN P3: 3.6 %
HGB F MFR BLD: 1 %
LDLC SERPL-MCNC: 72 MG/DL (ref 0–99)
POTASSIUM SERPL-SCNC: 3.9 MEQ/L (ref 3.5–5.1)
SODIUM SERPL-SCNC: 145 MEQ/L (ref 136–145)

## 2017-08-02 RX ADMIN — GABAPENTIN SCH MG: 100 CAPSULE ORAL at 13:00

## 2017-08-02 RX ADMIN — GABAPENTIN SCH MG: 100 CAPSULE ORAL at 19:09

## 2017-08-02 RX ADMIN — TRIHEXYPHENIDYL HYDROCHLORIDE SCH MG: 2 TABLET ORAL at 21:29

## 2017-08-02 RX ADMIN — MELOXICAM SCH MG: 7.5 TABLET ORAL at 09:21

## 2017-08-02 RX ADMIN — GABAPENTIN SCH MG: 100 CAPSULE ORAL at 09:21

## 2017-08-02 RX ADMIN — HALOPERIDOL SCH MG: 5 TABLET ORAL at 21:29

## 2017-08-02 RX ADMIN — MIRTAZAPINE SCH MG: 15 TABLET, FILM COATED ORAL at 21:30

## 2017-08-02 RX ADMIN — HALOPERIDOL SCH MG: 5 TABLET ORAL at 09:21

## 2017-08-02 RX ADMIN — DIVALPROEX SODIUM SCH MG: 500 TABLET, EXTENDED RELEASE ORAL at 21:29

## 2017-08-02 RX ADMIN — TRIHEXYPHENIDYL HYDROCHLORIDE SCH MG: 2 TABLET ORAL at 09:21
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
## 2017-08-03 VITALS
HEART RATE: 51 BPM | DIASTOLIC BLOOD PRESSURE: 64 MMHG | TEMPERATURE: 97.2 F | SYSTOLIC BLOOD PRESSURE: 115 MMHG | RESPIRATION RATE: 16 BRPM | OXYGEN SATURATION: 97 %

## 2017-08-03 VITALS
SYSTOLIC BLOOD PRESSURE: 146 MMHG | TEMPERATURE: 97 F | HEART RATE: 62 BPM | RESPIRATION RATE: 17 BRPM | OXYGEN SATURATION: 96 % | DIASTOLIC BLOOD PRESSURE: 65 MMHG

## 2017-08-03 RX ADMIN — HALOPERIDOL SCH MG: 5 TABLET ORAL at 08:52

## 2017-08-03 RX ADMIN — TRIHEXYPHENIDYL HYDROCHLORIDE SCH MG: 2 TABLET ORAL at 08:52

## 2017-08-03 RX ADMIN — GABAPENTIN SCH MG: 100 CAPSULE ORAL at 17:09

## 2017-08-03 RX ADMIN — DIVALPROEX SODIUM SCH MG: 500 TABLET, EXTENDED RELEASE ORAL at 21:24

## 2017-08-03 RX ADMIN — MIRTAZAPINE SCH MG: 15 TABLET, FILM COATED ORAL at 21:24

## 2017-08-03 RX ADMIN — GABAPENTIN SCH MG: 100 CAPSULE ORAL at 08:52

## 2017-08-03 RX ADMIN — HALOPERIDOL SCH MG: 5 TABLET ORAL at 21:24

## 2017-08-03 RX ADMIN — GABAPENTIN SCH MG: 100 CAPSULE ORAL at 11:55

## 2017-08-03 RX ADMIN — TRIHEXYPHENIDYL HYDROCHLORIDE SCH MG: 2 TABLET ORAL at 21:23

## 2017-08-03 RX ADMIN — MELOXICAM SCH MG: 7.5 TABLET ORAL at 08:52

## 2017-08-03 NOTE — EKG
Date Performed: 08/02/2017       Time Performed: 20:26:17

 

PTAGE:      61 years

 

EKG:      SINUS BRADYCARDIA NONSPECIFIC T-WAVE ABNORMALITY BORDERLINE ECG

 

PREVIOUS TRACING       : 07/23/2017 06.29 Since previous tracing, no significant change noted

 

DOCTOR:   Rosalee Hercules  Interpretating Date/Time  08/03/2017 14:47:34

## 2017-08-03 NOTE — HHI.PYPN
Subjective


Remarks


Patient is a 61-year-old  man, single, living in assisted living 

facility, unemployed on SSI, past psychiatric history psychiatric history of 

schizophrenia, multiple psychiatric hospitalizations, no prior suicide attempts

, no self-interest behavior, currently followed with outpatient treatment at [facility]

, was brought to the ED due to increase in auditory hallucinations for the past 

several days along with paranoid delusions and requesting change in 

medications.  Patient also reports that the out of those auditory 

hallucinations are command nature telling him to hurt others denying suicidal 

ideations in the context of marijuana use.  


Patient seen for follow-up, chart reviewed.  Patient found walking in the halls 

but was able to engage in interview.  Patient states that he has been feeling 

50% better.  Patient states that he feels that his medication except been 

helpful yet denies any perceptual disturbances and reports his mood to have 

been improving.  The possibility of having she psychotic symptoms in the 

context of substance use was explored with patient.  Patient states that he has 

been worried about where he would stay after discharge as he does not want to 

continue living in his current assisted living facility but he states that it 

is filled with people abusing drugs there..  Patient states that he plans on 

participating in groups and activities and going outside today.  Patient is 

time reports to feeling pretty good denies any SI, HI, AVH or delusions.  

Patient states that his  at [facility] is person named Thomas.





Review of Systems


Except as stated in HPI:  all other systems reviewed are Neg





Objective


Alert:  Yes


[place]:  Person (ox4), Place


Mood:  Calm, Other ("50% better")


Affect:  Restricted


Memory Intact:  Comment (Not  formally tested)


Hallucinations:  Other (denies at this time)


Delusions:  No (Does not disclose)


Delusion Type:  Other (denies at this time)


Suicidal:  Ideation (denies at this time)


Homicidal:  Ideation (Denies )


Insight/Judgment


Limited insight, fair impulse control, limited judgment


Vitals/IOs





 Vital Signs








  Date Time  Temp Pulse Resp B/P Pulse Ox O2 Delivery O2 Flow Rate FiO2


 


8/3/17 16:54 97.0 62 17 146/65 96   


 


8/1/17 14:14      Room Air  








 Intake and Output








 8/2/17 8/2/17 8/3/17





 08:00 16:00 00:00


 


Intake Total   360 ml


 


Balance   360 ml











Assessment & Plan


Problem List:  


(1) Schizophrenia


ICD Code:  F20.9


Assessment & Plan


Estimated LOS: 5-7 days.  Patient at this time continues to report improved 

mood and does not endorse any acute psychotic symptoms at this time.  Patient 

continues to be worried about his current assisted living facility and he does 

not to return there upon discharge.  Patient encouraged to continue current 

treatment.  Patient encouraged to participate in activities.  Discharge 

planning and process


Justification for Cont. Inpt.


Patient at risk for decompensation if it lower level of care


Discharge Planning


In progress





Problem Qualifiers





(1) Schizophrenia:  


Qualified Code:  F20.0 - Paranoid schizophrenia





Mani Petit MD Aug 3, 2017 19:44

## 2017-08-04 VITALS
SYSTOLIC BLOOD PRESSURE: 108 MMHG | TEMPERATURE: 98 F | OXYGEN SATURATION: 92 % | DIASTOLIC BLOOD PRESSURE: 74 MMHG | RESPIRATION RATE: 16 BRPM | HEART RATE: 58 BPM

## 2017-08-04 RX ADMIN — GABAPENTIN SCH MG: 100 CAPSULE ORAL at 11:45

## 2017-08-04 RX ADMIN — HALOPERIDOL SCH MG: 5 TABLET ORAL at 20:35

## 2017-08-04 RX ADMIN — GABAPENTIN SCH MG: 100 CAPSULE ORAL at 08:22

## 2017-08-04 RX ADMIN — MELOXICAM SCH MG: 7.5 TABLET ORAL at 08:22

## 2017-08-04 RX ADMIN — HALOPERIDOL SCH MG: 5 TABLET ORAL at 08:22

## 2017-08-04 RX ADMIN — GABAPENTIN SCH MG: 100 CAPSULE ORAL at 17:00

## 2017-08-04 RX ADMIN — DIVALPROEX SODIUM SCH MG: 500 TABLET, EXTENDED RELEASE ORAL at 20:35

## 2017-08-04 RX ADMIN — TRIHEXYPHENIDYL HYDROCHLORIDE SCH MG: 2 TABLET ORAL at 08:22

## 2017-08-04 RX ADMIN — TRIHEXYPHENIDYL HYDROCHLORIDE SCH MG: 2 TABLET ORAL at 20:35

## 2017-08-04 RX ADMIN — MIRTAZAPINE SCH MG: 15 TABLET, FILM COATED ORAL at 20:35

## 2017-08-04 NOTE — HHI.PYPN
Subjective


Remarks


Patient is a 61-year-old  man, single, living in assisted living 

facility, unemployed on SSI, past psychiatric history psychiatric history of 

schizophrenia, multiple psychiatric hospitalizations, no prior suicide attempts

, no self-interest behavior, currently followed with outpatient treatment at [facility]

, was brought to the ED due to increase in auditory hallucinations for the past 

several days along with paranoid delusions and requesting change in 

medications.  Patient also reports that the out of those auditory 

hallucinations are command nature telling him to hurt others denying suicidal 

ideations in the context of marijuana use.  


Patient seen for follow-up, chart reviewed.  As per nursing report patient is 

oddly related, reported sleeping well, taking medications with no behavioral 

issues.  Patient was found walking around the unit and was able to engage in 

interview.  Patient states that he has been feeling good, reports having 

auditory hallucinations off-and-on, last time being 1 minute ago during 

interview.  Patient does not seem to internally preoccupied but has been noted 

to be at times by nursing staff.  Patient continues to be worried about being 

discharged to his current assisted living facility and wonders whether there 

will be possibility of changing to a different one.  As time reports feeling 

okay denies SI, HI, VH or delusions.  But continues to endorse auditory 

hallucinations off and on.





Review of Systems


Except as stated in HPI:  all other systems reviewed are Neg





Objective


Alert:  Yes


[place]:  Person (ox4), Place


Mood:  Calm, Other (okay")


Affect:  Restricted (but was able to smile once or twice during interview)


Memory Intact:  Comment (Not  formally tested)


Hallucinations:  Auditory (noncommand)


Delusions:  No (Does not disclose)


Delusion Type:  Other (denies at this time)


Suicidal:  Ideation (denies at this time)


Homicidal:  Ideation (Denies )


Insight/Judgment


Limited insight, fair impulse control and judgment


Vitals/IOs





 Vital Signs








  Date Time  Temp Pulse Resp B/P Pulse Ox O2 Delivery O2 Flow Rate FiO2


 


8/4/17 05:37 98.0 58 16 108/74 92   


 


8/1/17 14:14      Room Air  











Assessment & Plan


Problem List:  


(1) Schizophrenia


ICD Code:  F20.9


Assessment & Plan


Estimated LOS:  5-7 days.  Patient this time continues to deny any depressive 

symptoms but continues to have occasional auditory hallucinations which are not 

command in nature.  Patient continues to worry about his current living 

facility and is looking for other options prior to discharge.  Patient 

encouraged to continue current treatment and participation in groups and 

activities.  Discharge planning in progress


Justification for Cont. Inpt.


Patient at risk for decompensation a lower level of care


Discharge Planning


In progress





Problem Qualifiers





(1) Schizophrenia:  


Qualified Code:  F20.0 - Paranoid schizophrenia





Mani Petit MD Aug 4, 2017 18:08

## 2017-08-05 VITALS
RESPIRATION RATE: 16 BRPM | SYSTOLIC BLOOD PRESSURE: 102 MMHG | TEMPERATURE: 98.8 F | DIASTOLIC BLOOD PRESSURE: 60 MMHG | OXYGEN SATURATION: 97 % | HEART RATE: 60 BPM

## 2017-08-05 VITALS
DIASTOLIC BLOOD PRESSURE: 67 MMHG | SYSTOLIC BLOOD PRESSURE: 113 MMHG | OXYGEN SATURATION: 97 % | TEMPERATURE: 98 F | RESPIRATION RATE: 16 BRPM | HEART RATE: 47 BPM

## 2017-08-05 LAB
ALP SERPL-CCNC: 44 U/L (ref 45–117)
ALT SERPL-CCNC: 32 U/L (ref 12–78)
ANION GAP SERPL CALC-SCNC: 5 MEQ/L (ref 5–15)
AST SERPL-CCNC: 20 U/L (ref 15–37)
BILIRUB SERPL-MCNC: 0.3 MG/DL (ref 0.2–1)
BUN SERPL-MCNC: 18 MG/DL (ref 7–18)
CHLORIDE SERPL-SCNC: 104 MEQ/L (ref 98–107)
GFR SERPLBLD BASED ON 1.73 SQ M-ARVRAT: 98 ML/MIN (ref 89–?)
HCO3 BLD-SCNC: 32.1 MEQ/L (ref 21–32)
POTASSIUM SERPL-SCNC: 4.3 MEQ/L (ref 3.5–5.1)
SODIUM SERPL-SCNC: 141 MEQ/L (ref 136–145)

## 2017-08-05 RX ADMIN — MIRTAZAPINE SCH MG: 15 TABLET, FILM COATED ORAL at 21:34

## 2017-08-05 RX ADMIN — TRIHEXYPHENIDYL HYDROCHLORIDE SCH MG: 2 TABLET ORAL at 21:34

## 2017-08-05 RX ADMIN — DIVALPROEX SODIUM SCH MG: 500 TABLET, EXTENDED RELEASE ORAL at 21:34

## 2017-08-05 RX ADMIN — GABAPENTIN SCH MG: 100 CAPSULE ORAL at 08:38

## 2017-08-05 RX ADMIN — HALOPERIDOL SCH MG: 5 TABLET ORAL at 21:34

## 2017-08-05 RX ADMIN — MELOXICAM SCH MG: 7.5 TABLET ORAL at 08:38

## 2017-08-05 RX ADMIN — TRIHEXYPHENIDYL HYDROCHLORIDE SCH MG: 2 TABLET ORAL at 08:38

## 2017-08-05 RX ADMIN — GABAPENTIN SCH MG: 100 CAPSULE ORAL at 17:13

## 2017-08-05 RX ADMIN — HALOPERIDOL SCH MG: 5 TABLET ORAL at 08:38

## 2017-08-05 RX ADMIN — GABAPENTIN SCH MG: 100 CAPSULE ORAL at 12:00

## 2017-08-05 NOTE — HHI.PYPN
Subjective


Remarks


Patient was seen and case discussed with nursing.  Patient says his 

hallucinations are resolved however he was observed talking to himself 

responding to internal stimuli per nursing.  Minimizes reasons for admission.  

Remains blunted and disheveled.  Denies suicidal ideation intent or plan.  

Depakote level is 73





Objective


Alert:  Yes


[place]:  Person (ox4), Place


Mood:  Calm, Other (okay")


Affect:  Restricted (but was able to smile once or twice during interview)


Memory Intact:  Comment (Not  formally tested)


Hallucinations:  Auditory (denies)


Delusions:  No (Does not disclose)


Delusion Type:  Other (internally stimulated)


Suicidal:  Ideation (denies at this time)


Homicidal:  Ideation (Denies )


Insight/Judgment


Poor


Labs











Test 8/5/17





 12:00


 


Sodium Level 141 MEQ/L


 


Potassium Level 4.3 MEQ/L


 


Chloride Level 104 MEQ/L


 


Carbon Dioxide Level 32.1 MEQ/L


 


Anion Gap 5 MEQ/L


 


Blood Urea Nitrogen 18 MG/DL


 


Creatinine 0.80 MG/DL


 


Estimat Glomerular Filtration 98 ML/MIN





Rate 


 


Random Glucose 88 MG/DL


 


Calcium Level 8.7 MG/DL


 


Total Bilirubin 0.3 MG/DL


 


Aspartate Amino Transf 20 U/L





(AST/SGOT) 


 


Alanine Aminotransferase 32 U/L





(ALT/SGPT) 


 


Alkaline Phosphatase 44 U/L


 


Total Protein 7.2 GM/DL


 


Albumin 3.4 GM/DL


 


Valproic Acid (Depakene) Level 73 MCG/ML








Vitals/IOs





 Vital Signs








  Date Time  Temp Pulse Resp B/P Pulse Ox O2 Delivery O2 Flow Rate FiO2


 


8/5/17 06:22 98.0 47 16 113/67 97   


 


8/1/17 14:14      Room Air  











Assessment & Plan


Problem List:  


(1) Schizophrenia


ICD Code:  F20.9


Assessment & Plan


Continue current treatment plan


Justification for Cont. Inpt.


Patient would decompensate in a less restrictive setting





Problem Qualifiers





(1) Schizophrenia:  


Qualified Code:  F20.0 - Paranoid schizophrenia





Gerson Shaw DO Aug 5, 2017 16:12

## 2017-08-06 VITALS
DIASTOLIC BLOOD PRESSURE: 62 MMHG | HEART RATE: 57 BPM | OXYGEN SATURATION: 96 % | RESPIRATION RATE: 18 BRPM | SYSTOLIC BLOOD PRESSURE: 111 MMHG | TEMPERATURE: 97.9 F

## 2017-08-06 VITALS
OXYGEN SATURATION: 98 % | TEMPERATURE: 97.6 F | RESPIRATION RATE: 18 BRPM | SYSTOLIC BLOOD PRESSURE: 112 MMHG | HEART RATE: 57 BPM | DIASTOLIC BLOOD PRESSURE: 70 MMHG

## 2017-08-06 RX ADMIN — TRIHEXYPHENIDYL HYDROCHLORIDE SCH MG: 2 TABLET ORAL at 21:19

## 2017-08-06 RX ADMIN — GABAPENTIN SCH MG: 100 CAPSULE ORAL at 17:32

## 2017-08-06 RX ADMIN — MELOXICAM SCH MG: 7.5 TABLET ORAL at 09:21

## 2017-08-06 RX ADMIN — HALOPERIDOL SCH MG: 5 TABLET ORAL at 09:21

## 2017-08-06 RX ADMIN — HALOPERIDOL SCH MG: 5 TABLET ORAL at 21:20

## 2017-08-06 RX ADMIN — GABAPENTIN SCH MG: 100 CAPSULE ORAL at 09:21

## 2017-08-06 RX ADMIN — GABAPENTIN SCH MG: 100 CAPSULE ORAL at 13:53

## 2017-08-06 RX ADMIN — TRIHEXYPHENIDYL HYDROCHLORIDE SCH MG: 2 TABLET ORAL at 09:21

## 2017-08-06 RX ADMIN — MIRTAZAPINE SCH MG: 15 TABLET, FILM COATED ORAL at 21:20

## 2017-08-06 RX ADMIN — DIVALPROEX SODIUM SCH MG: 500 TABLET, EXTENDED RELEASE ORAL at 21:19

## 2017-08-06 NOTE — HHI.PYPN
Subjective


Remarks


Patient was seen and case discussed with nursing.  Patient observed talking to 

himself in his room.  When asked about it he denied hallucinations.  Continues 

to minimize his symptoms.  Per nursing last night he had hallucinations telling 

him to hurt other people.  During this interview, patient was talking about 

meeting some [employer] today in a nearby motel





Objective


Alert:  Yes


[place]:  Person (ox4), Place


Mood:  Calm, Other (okay")


Affect:  Restricted (but was able to smile once or twice during interview)


Memory Intact:  Comment (Not  formally tested)


Hallucinations:  Auditory (denies)


Delusions:  No (Does not disclose)


Delusion Type:  Other (internally stimulated)


Suicidal:  Ideation (denies at this time)


Homicidal:  Ideation (Denies )


Insight/Judgment


Poor


Vitals/IOs





 Vital Signs








  Date Time  Temp Pulse Resp B/P Pulse Ox O2 Delivery O2 Flow Rate FiO2


 


8/6/17 05:55 97.9 57 18 111/62 96   











Assessment & Plan


Problem List:  


(1) Schizophrenia


ICD Code:  F20.9


Assessment & Plan


Continue current treatment plan


Justification for Cont. Inpt.


Patient would decompensate in a less restrictive setting





Problem Qualifiers





(1) Schizophrenia:  


Qualified Code:  F20.0 - Paranoid schizophrenia





Gerson Shaw DO Aug 6, 2017 12:37

## 2017-08-07 VITALS
OXYGEN SATURATION: 96 % | HEART RATE: 60 BPM | RESPIRATION RATE: 16 BRPM | DIASTOLIC BLOOD PRESSURE: 58 MMHG | TEMPERATURE: 97.9 F | SYSTOLIC BLOOD PRESSURE: 102 MMHG

## 2017-08-07 VITALS
RESPIRATION RATE: 18 BRPM | HEART RATE: 61 BPM | OXYGEN SATURATION: 98 % | TEMPERATURE: 97.7 F | SYSTOLIC BLOOD PRESSURE: 130 MMHG | DIASTOLIC BLOOD PRESSURE: 79 MMHG

## 2017-08-07 RX ADMIN — HALOPERIDOL SCH MG: 5 TABLET ORAL at 08:23

## 2017-08-07 RX ADMIN — MIRTAZAPINE SCH MG: 15 TABLET, FILM COATED ORAL at 20:25

## 2017-08-07 RX ADMIN — TRIHEXYPHENIDYL HYDROCHLORIDE SCH MG: 2 TABLET ORAL at 08:23

## 2017-08-07 RX ADMIN — GABAPENTIN SCH MG: 100 CAPSULE ORAL at 12:23

## 2017-08-07 RX ADMIN — HALOPERIDOL SCH MG: 5 TABLET ORAL at 20:25

## 2017-08-07 RX ADMIN — MELOXICAM SCH MG: 7.5 TABLET ORAL at 09:00

## 2017-08-07 RX ADMIN — DIVALPROEX SODIUM SCH MG: 500 TABLET, EXTENDED RELEASE ORAL at 20:26

## 2017-08-07 RX ADMIN — TRIHEXYPHENIDYL HYDROCHLORIDE SCH MG: 2 TABLET ORAL at 20:25

## 2017-08-07 RX ADMIN — GABAPENTIN SCH MG: 100 CAPSULE ORAL at 08:23

## 2017-08-07 RX ADMIN — MECLIZINE HYDROCHLORIDE PRN MG: 25 TABLET ORAL at 08:23

## 2017-08-07 RX ADMIN — GABAPENTIN SCH MG: 100 CAPSULE ORAL at 17:00

## 2017-08-07 NOTE — HHI.PYPN
Subjective


Remarks


Patient seen in his room with nurse Baljeet and medical students Amena and 

Wilda.  Chart review.  Patient compliant medications.  Patient showing he 

has persistent auditory hallucinations though they wax and wane to some degree.

  He states when they are [place] is suicidal ideation becomes more intense.  

For now continue treatment





Review of Systems


Except as stated in HPI:  all other systems reviewed are Neg





Objective


Alert:  Yes


[place]:  Person (ox4), Place


Mood:  Calm, Other (okay")


Affect:  Restricted (but was able to smile once or twice during interview)


Memory Intact:  Comment (Not  formally tested)


Hallucinations:  Auditory (denies)


Delusions:  No (Does not disclose)


Delusion Type:  Other (internally stimulated)


Suicidal:  Ideation (denies at this time)


Homicidal:  Ideation (Denies )


Insight/Judgment


Poor


Vitals/IOs





 Vital Signs








  Date Time  Temp Pulse Resp B/P Pulse Ox O2 Delivery O2 Flow Rate FiO2


 


8/7/17 15:39 97.7 61 18 130/79 98   











Assessment & Plan


Problem List:  


(1) Schizophrenia


ICD Code:  F20.9


Assessment & Plan


Estimated LOS:  days patient continue psychotic and suicidal.  Though compliant 

medications for now continue treatment


Justification for Cont. Inpt.


At this time patient will decompensate and placed in the lower level of care


Discharge Planning


To be determined





Problem Qualifiers





(1) Schizophrenia:  


Qualified Code:  F20.0 - Paranoid schizophrenia





Edgardo Eli MD Aug 7, 2017 16:26

## 2017-08-08 VITALS
HEART RATE: 50 BPM | RESPIRATION RATE: 16 BRPM | TEMPERATURE: 98.1 F | SYSTOLIC BLOOD PRESSURE: 114 MMHG | OXYGEN SATURATION: 96 % | DIASTOLIC BLOOD PRESSURE: 74 MMHG

## 2017-08-08 VITALS
TEMPERATURE: 96.9 F | RESPIRATION RATE: 18 BRPM | OXYGEN SATURATION: 99 % | DIASTOLIC BLOOD PRESSURE: 67 MMHG | HEART RATE: 69 BPM | SYSTOLIC BLOOD PRESSURE: 135 MMHG

## 2017-08-08 RX ADMIN — MECLIZINE HYDROCHLORIDE PRN MG: 25 TABLET ORAL at 10:01

## 2017-08-08 RX ADMIN — GABAPENTIN SCH MG: 100 CAPSULE ORAL at 13:41

## 2017-08-08 RX ADMIN — HALOPERIDOL SCH MG: 5 TABLET ORAL at 10:01

## 2017-08-08 RX ADMIN — GABAPENTIN SCH MG: 100 CAPSULE ORAL at 10:01

## 2017-08-08 RX ADMIN — DIVALPROEX SODIUM SCH MG: 500 TABLET, EXTENDED RELEASE ORAL at 21:50

## 2017-08-08 RX ADMIN — GABAPENTIN SCH MG: 100 CAPSULE ORAL at 18:00

## 2017-08-08 RX ADMIN — MELOXICAM SCH MG: 7.5 TABLET ORAL at 09:00

## 2017-08-08 RX ADMIN — MIRTAZAPINE SCH MG: 15 TABLET, FILM COATED ORAL at 21:50

## 2017-08-08 RX ADMIN — HALOPERIDOL SCH MG: 10 TABLET ORAL at 21:51

## 2017-08-08 RX ADMIN — TRIHEXYPHENIDYL HYDROCHLORIDE SCH MG: 2 TABLET ORAL at 10:00

## 2017-08-08 RX ADMIN — TRIHEXYPHENIDYL HYDROCHLORIDE SCH MG: 2 TABLET ORAL at 21:50

## 2017-08-08 NOTE — HHI.PYPN
Subjective


Remarks


Patient seen for follow-up, chart reviewed.  As per nursing report patient 

continues to report auditory hallucinations which he described as "abstract 

voices" but denied having any auditory hallucinations today.  Patient seen 

participating in a group activity but was able to speak with writer and noted 

to be calm and cooperative in interview.  Patient states that he be feeling 

"okay", denied any depressive symptoms reports having slept well, eating and 

drinking okay, no problems with urination or bowel movement.  Patient stated he'

s been up keeping with his hygiene which she has been showering daily.  She 

reports that his auditory hallucinations are currently "under control" in which 

she will describe them as them being "off and on" which last about half of 

minutes in duration in which he is told to "talk to God".  He states that this 

occurs about 3-4 times a day but that the hallucinations don't bother him.  

Patient this time denies SI, HI, AVH or delusions at this time.  Patient states 

that he would like a new assisted living facility but feels okay to return to 

his previous facility and continue to work with his  Thomas in 

terms of possibly relocating in a near future.





Review of Systems


Except as stated in HPI:  all other systems reviewed are Neg





Objective


Alert:  Yes


[place]:  Person (ox4), Place, Situation


Mood:  Calm, Other (okay")


Affect:  Restricted (but was able to smile once or twice during interview)


Memory Intact:  Comment (Not  formally tested)


Hallucinations:  Auditory (denies)


Delusions:  No (Does not disclose)


Delusion Type:  Other (none elicited at this time)


Suicidal:  Ideation (denies at this time)


Homicidal:  Ideation (Denies )


Insight/Judgment


Limited insight, fair impulse control and judgment


Vitals/IOs





 Vital Signs








  Date Time  Temp Pulse Resp B/P Pulse Ox O2 Delivery O2 Flow Rate FiO2


 


8/8/17 15:37 96.9 69 18 135/67 99   











Assessment & Plan


Problem List:  


(1) Schizophrenia


ICD Code:  F20.9


Assessment & Plan


Estimated LOS: 3-5 days.  Patient at this time continues to endorse perceptual 

disturbances, in which the auditory hallucinations are brief but patient 

reports able to ignore them.  Patient denies any depressive manic or anxiety 

symptoms at this time.  Although went be increased to 5 mg a.m. and 10 mg at 

bedtime for psychotic symptoms.  Monitor indication responsive possible adverse 

drug reactions.  Patient encouraged to continue with upkeep with hygiene and 

participate in groups and activities while on the unit.  Brief supportive 

psychotherapy provided.  Discharge planning in progress.


Justification for Cont. Inpt.


Patient risk for decompensation and a lower level of care.


Discharge Planning


In progress





Problem Qualifiers





(1) Schizophrenia:  


Qualified Code:  F20.0 - Paranoid schizophrenia





Mani Petit MD Aug 8, 2017 16:34

## 2017-08-09 VITALS
SYSTOLIC BLOOD PRESSURE: 113 MMHG | HEART RATE: 60 BPM | RESPIRATION RATE: 18 BRPM | TEMPERATURE: 98 F | DIASTOLIC BLOOD PRESSURE: 73 MMHG | OXYGEN SATURATION: 97 %

## 2017-08-09 VITALS
RESPIRATION RATE: 18 BRPM | HEART RATE: 62 BPM | SYSTOLIC BLOOD PRESSURE: 106 MMHG | OXYGEN SATURATION: 94 % | DIASTOLIC BLOOD PRESSURE: 66 MMHG | TEMPERATURE: 98.1 F

## 2017-08-09 RX ADMIN — TRIHEXYPHENIDYL HYDROCHLORIDE SCH MG: 2 TABLET ORAL at 21:00

## 2017-08-09 RX ADMIN — MIRTAZAPINE SCH MG: 15 TABLET, FILM COATED ORAL at 21:00

## 2017-08-09 RX ADMIN — HALOPERIDOL SCH MG: 5 TABLET ORAL at 08:22

## 2017-08-09 RX ADMIN — GABAPENTIN SCH MG: 100 CAPSULE ORAL at 13:14

## 2017-08-09 RX ADMIN — TRIHEXYPHENIDYL HYDROCHLORIDE SCH MG: 2 TABLET ORAL at 08:22

## 2017-08-09 RX ADMIN — MELOXICAM SCH MG: 7.5 TABLET ORAL at 09:00

## 2017-08-09 RX ADMIN — DIVALPROEX SODIUM SCH MG: 500 TABLET, EXTENDED RELEASE ORAL at 21:00

## 2017-08-09 RX ADMIN — GABAPENTIN SCH MG: 100 CAPSULE ORAL at 08:22

## 2017-08-09 RX ADMIN — GABAPENTIN SCH MG: 100 CAPSULE ORAL at 17:04

## 2017-08-09 RX ADMIN — HALOPERIDOL SCH MG: 10 TABLET ORAL at 21:00

## 2017-08-09 NOTE — HHI.PYPN
Subjective


Remarks


Patient seen for follow-up, chart reviewed.  Patient found lying in hospital 

bed was able to wake up and engage in interview.  Patient states that he has 

been feeling better, continues to endorse brief episodes of auditory 

hallucinations which she states that able to ignore last time was yesterday 

which last a couple of minutes.  Patient states that he has been attending 

groups and activities been feeling well with mood being "good".  Patient denies 

any depressive manic or other psychotic symptoms aside from occasional auditory 

hallucinations as described above.  Patient denies SI, HI, AVH or delusions.  

Patient reports adequate tolerance to current medication regimen and denies any 

adverse drug reactions.  Patient states that he is willing to go back to the 

assisted living facility and will continue working with  for other 

options in the future.





Review of Systems


Except as stated in HPI:  all other systems reviewed are Neg





Objective


Alert:  Yes


[place]:  Person (ox4), Place, Situation


Mood:  Calm, Other ("good")


Affect:  Restricted (but more reactive today)


Memory Intact:  Comment (Not  formally tested)


Hallucinations:  Auditory (occasional episodes but denies at time of interview.)


Delusions:  No (Does not disclose)


Delusion Type:  Other (none elicited at this time)


Suicidal:  Ideation (denies at this time)


Homicidal:  Ideation (Denies )


Insight/Judgment


Fair insight, impulse control and judgment


Vitals/IOs





 Vital Signs








  Date Time  Temp Pulse Resp B/P Pulse Ox O2 Delivery O2 Flow Rate FiO2


 


8/9/17 17:27 98.1 62 18 106/66 94   











Assessment & Plan


Problem List:  


(1) Schizophrenia


ICD Code:  F20.9


Assessment & Plan


Patient at this time reports improved mood and decrease in psychotic symptoms 

but occasionally reports auditory hallucinations which are brief and not 

distressful.  Patient states feeling well and would like to return to his 

assisted living facility soon.  Patient to continue current treatment.  

Continue to monitor medication response and adverse drug reactions.  Brief 

supportive psychotherapy provided.  Patient likely to be discharged tomorrow 

back to assisted living facility and to continue current treatment and follow-

up.


Justification for Cont. Inpt.


Patient at risk for decompensation at lower level of care


Discharge Planning


In progress





Problem Qualifiers





(1) Schizophrenia:  


Qualified Code:  F20.0 - Paranoid schizophrenia





Mani Petit MD Aug 9, 2017 18:54

## 2017-08-10 VITALS
OXYGEN SATURATION: 97 % | RESPIRATION RATE: 16 BRPM | DIASTOLIC BLOOD PRESSURE: 70 MMHG | SYSTOLIC BLOOD PRESSURE: 126 MMHG | HEART RATE: 74 BPM | TEMPERATURE: 98 F

## 2017-08-10 RX ADMIN — MELOXICAM SCH MG: 7.5 TABLET ORAL at 09:17

## 2017-08-10 RX ADMIN — GABAPENTIN SCH MG: 100 CAPSULE ORAL at 13:00

## 2017-08-10 RX ADMIN — HALOPERIDOL SCH MG: 5 TABLET ORAL at 09:17

## 2017-08-10 RX ADMIN — GABAPENTIN SCH MG: 100 CAPSULE ORAL at 09:17

## 2017-08-10 RX ADMIN — TRIHEXYPHENIDYL HYDROCHLORIDE SCH MG: 2 TABLET ORAL at 09:17
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
## 2017-08-11 ENCOUNTER — HOSPITAL ENCOUNTER (EMERGENCY)
Dept: HOSPITAL 17 - NEPD | Age: 62
Discharge: HOME | End: 2017-08-11
Payer: MEDICAID

## 2017-08-11 VITALS
OXYGEN SATURATION: 96 % | RESPIRATION RATE: 18 BRPM | TEMPERATURE: 98.5 F | SYSTOLIC BLOOD PRESSURE: 114 MMHG | HEART RATE: 68 BPM | DIASTOLIC BLOOD PRESSURE: 78 MMHG

## 2017-08-11 VITALS — HEIGHT: 68 IN | BODY MASS INDEX: 22.72 KG/M2 | WEIGHT: 149.91 LBS

## 2017-08-11 DIAGNOSIS — Z72.0: ICD-10-CM

## 2017-08-11 DIAGNOSIS — F20.9: Primary | ICD-10-CM

## 2017-08-11 DIAGNOSIS — Z79.899: ICD-10-CM

## 2017-08-11 LAB
ALP SERPL-CCNC: 47 U/L (ref 45–117)
ALT SERPL-CCNC: 37 U/L (ref 12–78)
ANION GAP SERPL CALC-SCNC: 5 MEQ/L (ref 5–15)
AST SERPL-CCNC: 26 U/L (ref 15–37)
BASOPHILS # BLD AUTO: 0 TH/MM3 (ref 0–0.2)
BASOPHILS NFR BLD: 0.4 % (ref 0–2)
BILIRUB SERPL-MCNC: 0.6 MG/DL (ref 0.2–1)
BUN SERPL-MCNC: 20 MG/DL (ref 7–18)
CHLORIDE SERPL-SCNC: 100 MEQ/L (ref 98–107)
COLOR UR: YELLOW
COMMENT (UR): (no result)
CULTURE IF INDICATED: (no result)
EOSINOPHIL # BLD: 0.1 TH/MM3 (ref 0–0.4)
EOSINOPHIL NFR BLD: 0.9 % (ref 0–4)
ERYTHROCYTE [DISTWIDTH] IN BLOOD BY AUTOMATED COUNT: 15.2 % (ref 11.6–17.2)
GFR SERPLBLD BASED ON 1.73 SQ M-ARVRAT: 102 ML/MIN (ref 89–?)
GLUCOSE UR STRIP-MCNC: (no result) MG/DL
HCO3 BLD-SCNC: 30.6 MEQ/L (ref 21–32)
HCT VFR BLD CALC: 40.2 % (ref 39–51)
HEMO FLAGS: (no result)
HGB UR QL STRIP: (no result)
KETONES UR STRIP-MCNC: (no result) MG/DL
LYMPHOCYTES # BLD AUTO: 2.2 TH/MM3 (ref 1–4.8)
LYMPHOCYTES NFR BLD AUTO: 24.7 % (ref 9–44)
MCH RBC QN AUTO: 30.9 PG (ref 27–34)
MCHC RBC AUTO-ENTMCNC: 34.1 % (ref 32–36)
MCV RBC AUTO: 90.6 FL (ref 80–100)
MONOCYTES NFR BLD: 8.5 % (ref 0–8)
NEUTROPHILS # BLD AUTO: 5.8 TH/MM3 (ref 1.8–7.7)
NEUTROPHILS NFR BLD AUTO: 65.5 % (ref 16–70)
NITRITE UR QL STRIP: (no result)
PLATELET # BLD: 167 TH/MM3 (ref 150–450)
POTASSIUM SERPL-SCNC: 3.9 MEQ/L (ref 3.5–5.1)
RBC # BLD AUTO: 4.43 MIL/MM3 (ref 4.5–5.9)
SODIUM SERPL-SCNC: 136 MEQ/L (ref 136–145)
SP GR UR STRIP: 1.02 (ref 1–1.03)
WBC # BLD AUTO: 8.9 TH/MM3 (ref 4–11)

## 2017-08-11 PROCEDURE — 85025 COMPLETE CBC W/AUTO DIFF WBC: CPT

## 2017-08-11 PROCEDURE — 80053 COMPREHEN METABOLIC PANEL: CPT

## 2017-08-11 PROCEDURE — 99283 EMERGENCY DEPT VISIT LOW MDM: CPT

## 2017-08-11 PROCEDURE — 81001 URINALYSIS AUTO W/SCOPE: CPT

## 2017-08-11 PROCEDURE — 80307 DRUG TEST PRSMV CHEM ANLYZR: CPT

## 2017-08-11 NOTE — PD
HPI


Chief Complaint:  Psychiatric Symptoms


Time Seen by Provider:  09:48


Travel History


International Travel<30 days:  No


Contact w/Intl Traveler<30days:  No


Traveled to known affect area:  No





History of Present Illness


HPI


This is a 61-year-old man who presents to the emergency department from 

[facility] stating that he is hearing voices.  He was discharged from 

inpatient psychiatry yesterday.  Patient states he feels like the medications 

aren't working.  States he otherwise has been feeling well and healthy.  No 

recent illness or injury.  He follows with [facility] for outpatient 

psychiatry.





History


Past Medical History


*** Narrative Medical


Schizophrenia





Social History


Alcohol Use:  Yes (occasionally)


Tobacco Use:  Yes





Allergies-Medications


(Allergen,Severity, Reaction):  


Coded Allergies:  


     No Known Allergies (Verified , 6/23/17)


 Per MAR faxed by Bety Miller [phone].


Reported Meds & Prescriptions





Reported Meds & Active Scripts


Active


Trazodone (Trazodone HCl) 100 Mg Tablet 100 Mg PO HS


Trihexyphenidyl (Trihexyphenidyl HCl) 2 Mg Tab 2 Mg PO BID


Mirtazapine 15 Mg Tab 15 Mg PO HS


Meloxicam 7.5 Mg Tab 7.5 Mg PO DAILY


Haloperidol 10 Mg Tab 10 Mg PO HS


Haloperidol 5 Mg Tab 5 Mg PO DAILY


Gabapentin 100 Mg Cap 100 Mg PO TID


Depakote ER (Divalproex Sodium) 500 Mg Michael 1,000 Mg PO HS


Meclizine (Meclizine HCl) 12.5 Mg Tab 12.5 Mg PO TID PRN


Trazodone (Trazodone HCl) 100 Mg Tablet 100 Mg PO HS 15 Days


Mirtazapine 15 Mg Tab 15 Mg PO HS 15 Days


Divalproex ER (Divalproex Sodium) 500 Mg Tab 1,000 Mg PO HS 15 Days


Gabapentin 100 Mg Cap 100 Mg PO TID 15 Days


Reported


Abilify Maintena Dual Chamber Inj (Aripiprazole) 400 Mg Inj 400 Mg IM Q28D


Haloperidol 5 Mg Tab 5 Mg PO BID








Review of Systems


Except as stated in HPI:  all other systems reviewed are Neg





Physical Exam


Narrative


GENERAL: Well-appearing 61-year-old man, no acute distress.


SKIN: Focused skin assessment warm/dry.


HEAD: Atraumatic. Normocephalic. 


EYES: Pupils equal and round. No scleral icterus. No injection or drainage. 


ENT: No nasal bleeding or discharge.  Mucous membranes pink and moist.


NECK: Trachea midline. No JVD. 


CARDIOVASCULAR: Regular rate and rhythm.  No murmur appreciated.


RESPIRATORY: No accessory muscle use. Clear to auscultation. Breath sounds 

equal bilaterally. 


GASTROINTESTINAL: Abdomen soft, non-tender, nondistended. Hepatic and splenic 

margins not palpable. 


MUSCULOSKELETAL: No obvious deformities. No clubbing.  No cyanosis.  No edema. 


NEUROLOGICAL: Awake and alert. No obvious cranial nerve deficits.  Motor 

grossly within normal limits. Normal speech.


PSYCHIATRIC: Calm and cooperative.  Fair insight and judgment.  Not obviously 

responding to internal stimuli.





Data


Data


Last Documented VS





Vital Signs








  Date Time  Temp Pulse Resp B/P Pulse Ox O2 Delivery O2 Flow Rate FiO2


 


8/11/17 08:03   16     


 


8/11/17 07:57 98.5 68  114/78 96   








Orders





 Complete Blood Count With Diff (8/11/17 07:58)


Comprehensive Metabolic Panel (8/11/17 07:58)


Urinalysis - C+S If Indicated (8/11/17 07:58)


Diet Regular Basic (8/11/17 Breakfast)


Drug Screen, Random Urine (8/11/17 07:58)





Labs





 Laboratory Tests








Test 8/11/17





 08:07


 


White Blood Count 8.9 TH/MM3


 


Red Blood Count 4.43 MIL/MM3


 


Hemoglobin 13.7 GM/DL


 


Hematocrit 40.2 %


 


Mean Corpuscular Volume 90.6 FL


 


Mean Corpuscular Hemoglobin 30.9 PG


 


Mean Corpuscular Hemoglobin 34.1 %





Concent 


 


Red Cell Distribution Width 15.2 %


 


Platelet Count 167 TH/MM3


 


Mean Platelet Volume 7.9 FL


 


Neutrophils (%) (Auto) 65.5 %


 


Lymphocytes (%) (Auto) 24.7 %


 


Monocytes (%) (Auto) 8.5 %


 


Eosinophils (%) (Auto) 0.9 %


 


Basophils (%) (Auto) 0.4 %


 


Neutrophils # (Auto) 5.8 TH/MM3


 


Lymphocytes # (Auto) 2.2 TH/MM3


 


Monocytes # (Auto) 0.8 TH/MM3


 


Eosinophils # (Auto) 0.1 TH/MM3


 


Basophils # (Auto) 0.0 TH/MM3


 


CBC Comment DIFF FINAL 


 


Differential Comment  


 


Urine Color YELLOW 


 


Urine Turbidity CLEAR 


 


Urine pH 7.5 


 


Urine Specific Gravity 1.020 


 


Urine Protein NEG mg/dL


 


Urine Glucose (UA) NEG mg/dL


 


Urine Ketones NEG mg/dL


 


Urine Occult Blood NEG 


 


Urine Nitrite NEG 


 


Urine Bilirubin NEG 


 


Urine Urobilinogen 8.0 MG/DL


 


Urine Leukocyte Esterase NEG 


 


Urine RBC 2 /hpf


 


Microscopic Urinalysis Comment CULT NOT





 INDICATED


 


Sodium Level 136 MEQ/L


 


Potassium Level 3.9 MEQ/L


 


Chloride Level 100 MEQ/L


 


Carbon Dioxide Level 30.6 MEQ/L


 


Anion Gap 5 MEQ/L


 


Blood Urea Nitrogen 20 MG/DL


 


Creatinine 0.77 MG/DL


 


Estimat Glomerular Filtration 102 ML/MIN





Rate 


 


Random Glucose 81 MG/DL


 


Calcium Level 8.4 MG/DL


 


Total Bilirubin 0.6 MG/DL


 


Aspartate Amino Transf 26 U/L





(AST/SGOT) 


 


Alanine Aminotransferase 37 U/L





(ALT/SGPT) 


 


Alkaline Phosphatase 47 U/L


 


Total Protein 7.7 GM/DL


 


Albumin 3.7 GM/DL











[facility]


Medical Decision Making


Medical Screen Exam Complete:  Yes


Emergency Medical Condition:  Yes


Interpretation(s)


LABS:


CBC is unremarkable.


CMP is unremarkable.


UA is unremarkable.


Differential Diagnosis


Schizophrenia, adjustment reaction, other


Narrative Course


Medical decision making.





60-year-old male with schizophrenia, discharged from inpatient psych yesterday, 

on medications for schizophrenia.  States he still hearing voices.  He is not 

an imminent threat to himself or others.  Insight and judgment are fair.  

Initially wanted to see a psychiatrist here that he change his mind sided one 

to go back to [facility].  I think this is reasonable.  I think it is more 

[facility] medications.  He has outpatient psychiatry follow-up.  We'll arrange for 

transport back to [facility].





Diagnosis





 Primary Impression:  


 Schizophrenia





***Additional Instructions:


Follow-up with your outpatient psychiatrist.


***Med/Other Pt SpecificInfo:  No Change to Meds


Disposition:  01 DISCHARGE HOME


Condition:  Stable








Rakan Vickers MD Aug 11, 2017 10:11

## 2017-08-12 ENCOUNTER — HOSPITAL ENCOUNTER (INPATIENT)
Dept: HOSPITAL 17 - NEPD | Age: 62
LOS: 5 days | Discharge: NURSING, REHAB OR LONG-TERM CARE | DRG: 885 | End: 2017-08-17
Attending: PSYCHIATRY & NEUROLOGY | Admitting: PSYCHIATRY & NEUROLOGY
Payer: COMMERCIAL

## 2017-08-12 VITALS — HEIGHT: 68 IN | WEIGHT: 172.4 LBS | BODY MASS INDEX: 26.13 KG/M2

## 2017-08-12 VITALS
DIASTOLIC BLOOD PRESSURE: 77 MMHG | OXYGEN SATURATION: 98 % | RESPIRATION RATE: 18 BRPM | SYSTOLIC BLOOD PRESSURE: 118 MMHG | TEMPERATURE: 98.3 F | HEART RATE: 77 BPM

## 2017-08-12 DIAGNOSIS — F17.210: ICD-10-CM

## 2017-08-12 DIAGNOSIS — Z81.8: ICD-10-CM

## 2017-08-12 DIAGNOSIS — M19.90: ICD-10-CM

## 2017-08-12 DIAGNOSIS — F20.9: Primary | ICD-10-CM

## 2017-08-12 DIAGNOSIS — J44.9: ICD-10-CM

## 2017-08-12 DIAGNOSIS — I10: ICD-10-CM

## 2017-08-12 LAB
ALP SERPL-CCNC: 48 U/L (ref 45–117)
ALT SERPL-CCNC: 40 U/L (ref 12–78)
ANION GAP SERPL CALC-SCNC: 7 MEQ/L (ref 5–15)
AST SERPL-CCNC: 25 U/L (ref 15–37)
BILIRUB SERPL-MCNC: 0.5 MG/DL (ref 0.2–1)
BUN SERPL-MCNC: 27 MG/DL (ref 7–18)
CHLORIDE SERPL-SCNC: 103 MEQ/L (ref 98–107)
ETHANOL SERPL-MCNC: (no result) MG/DL (ref 0–5)
GFR SERPLBLD BASED ON 1.73 SQ M-ARVRAT: 91 ML/MIN (ref 89–?)
HCO3 BLD-SCNC: 27.5 MEQ/L (ref 21–32)
POTASSIUM SERPL-SCNC: 3.8 MEQ/L (ref 3.5–5.1)
SODIUM SERPL-SCNC: 137 MEQ/L (ref 136–145)

## 2017-08-12 PROCEDURE — 90471 IMMUNIZATION ADMIN: CPT

## 2017-08-12 PROCEDURE — 80061 LIPID PANEL: CPT

## 2017-08-12 PROCEDURE — 81001 URINALYSIS AUTO W/SCOPE: CPT

## 2017-08-12 PROCEDURE — 90715 TDAP VACCINE 7 YRS/> IM: CPT

## 2017-08-12 PROCEDURE — 80307 DRUG TEST PRSMV CHEM ANLYZR: CPT

## 2017-08-12 PROCEDURE — 80164 ASSAY DIPROPYLACETIC ACD TOT: CPT

## 2017-08-12 PROCEDURE — 80053 COMPREHEN METABOLIC PANEL: CPT

## 2017-08-12 PROCEDURE — 80048 BASIC METABOLIC PNL TOTAL CA: CPT

## 2017-08-12 PROCEDURE — 99283 EMERGENCY DEPT VISIT LOW MDM: CPT

## 2017-08-12 PROCEDURE — 85025 COMPLETE CBC W/AUTO DIFF WBC: CPT

## 2017-08-12 PROCEDURE — 83036 HEMOGLOBIN GLYCOSYLATED A1C: CPT
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
## 2017-08-13 VITALS
SYSTOLIC BLOOD PRESSURE: 127 MMHG | TEMPERATURE: 98 F | DIASTOLIC BLOOD PRESSURE: 76 MMHG | RESPIRATION RATE: 16 BRPM | OXYGEN SATURATION: 98 % | HEART RATE: 69 BPM

## 2017-08-13 VITALS
DIASTOLIC BLOOD PRESSURE: 87 MMHG | SYSTOLIC BLOOD PRESSURE: 141 MMHG | RESPIRATION RATE: 18 BRPM | HEART RATE: 67 BPM | TEMPERATURE: 99.2 F

## 2017-08-13 VITALS
OXYGEN SATURATION: 96 % | RESPIRATION RATE: 18 BRPM | SYSTOLIC BLOOD PRESSURE: 128 MMHG | DIASTOLIC BLOOD PRESSURE: 79 MMHG | HEART RATE: 63 BPM

## 2017-08-13 RX ADMIN — DIVALPROEX SODIUM SCH MG: 500 TABLET, EXTENDED RELEASE ORAL at 20:11

## 2017-08-13 RX ADMIN — MIRTAZAPINE SCH MG: 15 TABLET, FILM COATED ORAL at 20:11

## 2017-08-13 RX ADMIN — TRIHEXYPHENIDYL HYDROCHLORIDE SCH MG: 2 TABLET ORAL at 20:11

## 2017-08-13 RX ADMIN — GABAPENTIN SCH MG: 100 CAPSULE ORAL at 17:56

## 2017-08-13 RX ADMIN — HALOPERIDOL SCH MG: 10 TABLET ORAL at 20:11

## 2017-08-14 VITALS
DIASTOLIC BLOOD PRESSURE: 54 MMHG | TEMPERATURE: 98.7 F | OXYGEN SATURATION: 100 % | SYSTOLIC BLOOD PRESSURE: 98 MMHG | HEART RATE: 50 BPM | RESPIRATION RATE: 16 BRPM

## 2017-08-14 VITALS
RESPIRATION RATE: 16 BRPM | OXYGEN SATURATION: 99 % | TEMPERATURE: 98.2 F | HEART RATE: 59 BPM | DIASTOLIC BLOOD PRESSURE: 67 MMHG | SYSTOLIC BLOOD PRESSURE: 102 MMHG

## 2017-08-14 LAB
ANION GAP SERPL CALC-SCNC: 7 MEQ/L (ref 5–15)
BUN SERPL-MCNC: 19 MG/DL (ref 7–18)
CHLORIDE SERPL-SCNC: 106 MEQ/L (ref 98–107)
GFR SERPLBLD BASED ON 1.73 SQ M-ARVRAT: 107 ML/MIN (ref 89–?)
HCO3 BLD-SCNC: 29.1 MEQ/L (ref 21–32)
HDLC SERPL-MCNC: 40.7 MG/DL (ref 40–60)
HEMOGLOBIN A1A: 1 %
HEMOGLOBIN A1B: 0.7 %
HEMOGLOBIN AO: 86.1 %
HEMOGLOBIN LA1C: 1.8 %
HEMOGLOBIN P3: 3.6 %
HGB F MFR BLD: 1.1 %
LDLC SERPL-MCNC: 86 MG/DL (ref 0–99)
POTASSIUM SERPL-SCNC: 4.3 MEQ/L (ref 3.5–5.1)
SODIUM SERPL-SCNC: 142 MEQ/L (ref 136–145)

## 2017-08-14 RX ADMIN — TRIHEXYPHENIDYL HYDROCHLORIDE SCH MG: 2 TABLET ORAL at 09:07

## 2017-08-14 RX ADMIN — HALOPERIDOL SCH MG: 10 TABLET ORAL at 21:16

## 2017-08-14 RX ADMIN — GABAPENTIN SCH MG: 100 CAPSULE ORAL at 18:00

## 2017-08-14 RX ADMIN — GABAPENTIN SCH MG: 100 CAPSULE ORAL at 09:07

## 2017-08-14 RX ADMIN — MIRTAZAPINE SCH MG: 15 TABLET, FILM COATED ORAL at 21:16

## 2017-08-14 RX ADMIN — NICOTINE SCH PATCH: 21 PATCH, EXTENDED RELEASE TOPICAL at 09:00

## 2017-08-14 RX ADMIN — TRIHEXYPHENIDYL HYDROCHLORIDE SCH MG: 2 TABLET ORAL at 21:16

## 2017-08-14 RX ADMIN — GABAPENTIN SCH MG: 100 CAPSULE ORAL at 12:38

## 2017-08-14 RX ADMIN — MELOXICAM SCH MG: 7.5 TABLET ORAL at 09:00

## 2017-08-14 RX ADMIN — DIVALPROEX SODIUM SCH MG: 500 TABLET, EXTENDED RELEASE ORAL at 21:16
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
## 2017-08-15 VITALS
TEMPERATURE: 97.4 F | OXYGEN SATURATION: 96 % | SYSTOLIC BLOOD PRESSURE: 115 MMHG | HEART RATE: 48 BPM | DIASTOLIC BLOOD PRESSURE: 69 MMHG | RESPIRATION RATE: 18 BRPM

## 2017-08-15 RX ADMIN — TRIHEXYPHENIDYL HYDROCHLORIDE SCH MG: 2 TABLET ORAL at 08:17

## 2017-08-15 RX ADMIN — GABAPENTIN SCH MG: 100 CAPSULE ORAL at 08:17

## 2017-08-15 RX ADMIN — GABAPENTIN SCH MG: 100 CAPSULE ORAL at 12:45

## 2017-08-15 RX ADMIN — GABAPENTIN SCH MG: 100 CAPSULE ORAL at 17:22

## 2017-08-15 RX ADMIN — HALOPERIDOL SCH MG: 5 TABLET ORAL at 08:17

## 2017-08-15 RX ADMIN — MELOXICAM SCH MG: 7.5 TABLET ORAL at 08:17

## 2017-08-15 RX ADMIN — MIRTAZAPINE SCH MG: 15 TABLET, FILM COATED ORAL at 20:33

## 2017-08-15 RX ADMIN — TRIHEXYPHENIDYL HYDROCHLORIDE SCH MG: 2 TABLET ORAL at 20:34

## 2017-08-15 RX ADMIN — HALOPERIDOL SCH MG: 10 TABLET ORAL at 20:34

## 2017-08-15 RX ADMIN — DIVALPROEX SODIUM SCH MG: 500 TABLET, EXTENDED RELEASE ORAL at 20:34

## 2017-08-15 RX ADMIN — NICOTINE SCH PATCH: 21 PATCH, EXTENDED RELEASE TOPICAL at 08:17

## 2017-08-16 VITALS
TEMPERATURE: 97.6 F | RESPIRATION RATE: 16 BRPM | OXYGEN SATURATION: 95 % | HEART RATE: 53 BPM | DIASTOLIC BLOOD PRESSURE: 78 MMHG | SYSTOLIC BLOOD PRESSURE: 113 MMHG

## 2017-08-16 RX ADMIN — TRIHEXYPHENIDYL HYDROCHLORIDE SCH MG: 2 TABLET ORAL at 21:18

## 2017-08-16 RX ADMIN — MIRTAZAPINE SCH MG: 15 TABLET, FILM COATED ORAL at 21:18

## 2017-08-16 RX ADMIN — GABAPENTIN SCH MG: 100 CAPSULE ORAL at 08:47

## 2017-08-16 RX ADMIN — NICOTINE SCH PATCH: 21 PATCH, EXTENDED RELEASE TOPICAL at 08:47

## 2017-08-16 RX ADMIN — GABAPENTIN SCH MG: 100 CAPSULE ORAL at 13:32

## 2017-08-16 RX ADMIN — MELOXICAM SCH MG: 7.5 TABLET ORAL at 08:47

## 2017-08-16 RX ADMIN — HALOPERIDOL SCH MG: 5 TABLET ORAL at 08:47

## 2017-08-16 RX ADMIN — TRIHEXYPHENIDYL HYDROCHLORIDE SCH MG: 2 TABLET ORAL at 08:46

## 2017-08-16 RX ADMIN — DIVALPROEX SODIUM SCH MG: 500 TABLET, EXTENDED RELEASE ORAL at 21:18

## 2017-08-16 RX ADMIN — HALOPERIDOL SCH MG: 10 TABLET ORAL at 21:18

## 2017-08-16 RX ADMIN — GABAPENTIN SCH MG: 100 CAPSULE ORAL at 18:00

## 2017-08-17 VITALS
SYSTOLIC BLOOD PRESSURE: 113 MMHG | OXYGEN SATURATION: 96 % | TEMPERATURE: 97.4 F | RESPIRATION RATE: 16 BRPM | DIASTOLIC BLOOD PRESSURE: 75 MMHG | HEART RATE: 55 BPM

## 2017-08-17 RX ADMIN — GABAPENTIN SCH MG: 100 CAPSULE ORAL at 08:46

## 2017-08-17 RX ADMIN — NICOTINE SCH PATCH: 21 PATCH, EXTENDED RELEASE TOPICAL at 08:50

## 2017-08-17 RX ADMIN — GABAPENTIN SCH MG: 100 CAPSULE ORAL at 17:07

## 2017-08-17 RX ADMIN — GABAPENTIN SCH MG: 100 CAPSULE ORAL at 14:07

## 2017-08-17 RX ADMIN — HALOPERIDOL SCH MG: 5 TABLET ORAL at 08:46

## 2017-08-17 RX ADMIN — MELOXICAM SCH MG: 7.5 TABLET ORAL at 08:46

## 2017-08-17 RX ADMIN — TRIHEXYPHENIDYL HYDROCHLORIDE SCH MG: 2 TABLET ORAL at 08:45
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
## 2017-08-26 ENCOUNTER — HOSPITAL ENCOUNTER (EMERGENCY)
Dept: HOSPITAL 17 - NEPE | Age: 62
Discharge: HOME | End: 2017-08-26
Payer: COMMERCIAL

## 2017-08-26 VITALS
SYSTOLIC BLOOD PRESSURE: 152 MMHG | OXYGEN SATURATION: 97 % | RESPIRATION RATE: 20 BRPM | HEART RATE: 74 BPM | DIASTOLIC BLOOD PRESSURE: 93 MMHG | TEMPERATURE: 98 F

## 2017-08-26 VITALS
HEART RATE: 74 BPM | SYSTOLIC BLOOD PRESSURE: 144 MMHG | RESPIRATION RATE: 20 BRPM | OXYGEN SATURATION: 97 % | DIASTOLIC BLOOD PRESSURE: 88 MMHG

## 2017-08-26 VITALS — HEIGHT: 68 IN | BODY MASS INDEX: 22.72 KG/M2 | WEIGHT: 149.91 LBS

## 2017-08-26 VITALS
RESPIRATION RATE: 19 BRPM | HEART RATE: 80 BPM | SYSTOLIC BLOOD PRESSURE: 148 MMHG | DIASTOLIC BLOOD PRESSURE: 89 MMHG | OXYGEN SATURATION: 97 %

## 2017-08-26 DIAGNOSIS — Z02.89: Primary | ICD-10-CM

## 2017-08-26 DIAGNOSIS — Z86.59: ICD-10-CM

## 2017-08-26 DIAGNOSIS — F43.22: ICD-10-CM

## 2017-08-26 DIAGNOSIS — Z87.09: ICD-10-CM

## 2017-08-26 DIAGNOSIS — Z87.39: ICD-10-CM

## 2017-08-26 DIAGNOSIS — F22: ICD-10-CM

## 2017-08-26 LAB
ALP SERPL-CCNC: 49 U/L (ref 45–117)
ALT SERPL-CCNC: 25 U/L (ref 12–78)
ANION GAP SERPL CALC-SCNC: 6 MEQ/L (ref 5–15)
AST SERPL-CCNC: 17 U/L (ref 15–37)
BASOPHILS # BLD AUTO: 0 TH/MM3 (ref 0–0.2)
BASOPHILS NFR BLD: 0.6 % (ref 0–2)
BILIRUB SERPL-MCNC: 0.4 MG/DL (ref 0.2–1)
BUN SERPL-MCNC: 11 MG/DL (ref 7–18)
CHLORIDE SERPL-SCNC: 104 MEQ/L (ref 98–107)
EOSINOPHIL # BLD: 0.1 TH/MM3 (ref 0–0.4)
EOSINOPHIL NFR BLD: 2.3 % (ref 0–4)
ERYTHROCYTE [DISTWIDTH] IN BLOOD BY AUTOMATED COUNT: 14.8 % (ref 11.6–17.2)
ETHANOL SERPL-MCNC: (no result) MG/DL (ref 0–5)
GFR SERPLBLD BASED ON 1.73 SQ M-ARVRAT: 95 ML/MIN (ref 89–?)
HCO3 BLD-SCNC: 30.9 MEQ/L (ref 21–32)
HCT VFR BLD CALC: 41.6 % (ref 39–51)
HEMO FLAGS: (no result)
LYMPHOCYTES # BLD AUTO: 2.1 TH/MM3 (ref 1–4.8)
LYMPHOCYTES NFR BLD AUTO: 39.7 % (ref 9–44)
MCH RBC QN AUTO: 30.7 PG (ref 27–34)
MCHC RBC AUTO-ENTMCNC: 33.9 % (ref 32–36)
MCV RBC AUTO: 90.5 FL (ref 80–100)
MONOCYTES NFR BLD: 7.4 % (ref 0–8)
NEUTROPHILS # BLD AUTO: 2.6 TH/MM3 (ref 1.8–7.7)
NEUTROPHILS NFR BLD AUTO: 50 % (ref 16–70)
PLATELET # BLD: 183 TH/MM3 (ref 150–450)
POTASSIUM SERPL-SCNC: 3.6 MEQ/L (ref 3.5–5.1)
RBC # BLD AUTO: 4.59 MIL/MM3 (ref 4.5–5.9)
SODIUM SERPL-SCNC: 141 MEQ/L (ref 136–145)
WBC # BLD AUTO: 5.2 TH/MM3 (ref 4–11)

## 2017-08-26 PROCEDURE — 85025 COMPLETE CBC W/AUTO DIFF WBC: CPT

## 2017-08-26 PROCEDURE — 80053 COMPREHEN METABOLIC PANEL: CPT

## 2017-08-26 PROCEDURE — 99283 EMERGENCY DEPT VISIT LOW MDM: CPT

## 2017-08-26 PROCEDURE — 80164 ASSAY DIPROPYLACETIC ACD TOT: CPT

## 2017-08-26 PROCEDURE — 80307 DRUG TEST PRSMV CHEM ANLYZR: CPT

## 2017-08-26 NOTE — PD
__________________________________________________





History of Present Illness


Chief Complaint:  Psychiatric Symptoms


Time Seen by Provider:  15:00


Travel History


International Travel<30 Days:  No


Contact w/Intl Traveler<30days:  No


Known affected area:  No





Legal Status


Legal Status:  Voluntary


History of Present Illness:


Patient wants a new group home.  He is no longer satisfied with [facility], 

even though he has been there for a long time.  He is not suicidal or 

homicidal.  He in fact verbally contracts for safety.  He has no psychotic 

symptoms.  His cognition is baseline.  He is seen at [facility] and wants 

his  to get him a new group home.





PFSH


Past Medical History


Hx Anticoagulant Therapy:  No


Arthritis:  Yes (IN LEGS )


Autoimmune Disease:  No


Blood Disorders:  No


Anxiety:  Yes


Depression:  Yes


Cardiovascular Problems:  No


Chemotherapy:  No


Chest Pain:  No


Congestive Heart Failure:  No


COPD:  Yes


Cerebrovascular Accident:  No


Diabetes:  No


Diminished Hearing:  No


Endocrine:  No


Gastrointestinal Disorders:  No


GERD:  No


Genitourinary:  No


Hiatal Hernia:  No


Immune Disorder:  No


Implanted Vascular Access Dvce:  No


Kidney Stones:  No


Musculoskeletal:  Yes


Neurologic:  No


Psychiatric:  Yes (Schizophrenia)


Reproductive:  No


Respiratory:  No


Immunizations Current:  Yes


Migraines:  No


Radiation Therapy:  No


Renal Failure:  No


Schizophrenia:  Yes


Sickle Cell Disease:  No


Thyroid Disease:  No


Ulcer:  No





Past Surgical History


Abdominal Surgery:  Yes (GALL BLADDER REMOVED)


AICD:  No


Arteriovenous Shunt:  No


Cardiac Surgery:  No


Cholecystectomy:  Yes


Ear Surgery:  No


Endocrine Surgery:  No


Eye Surgery:  No


Genitourinary Surgery:  No


Gynecologic Surgery:  No


Insulin Pump:  No


Joint Replacement:  No


Oral Surgery:  No


Pacemaker:  No


Thoracic Surgery:  No


Other Surgery:  Yes





Psychiatric History


Psychiatric History


Hx Psychiatric Treatment:  


Patient's last visit to [facility] was 6-23-17 to 6/26/17. Patient reports in 


previous biopsychosocial that he has had at least a 100 admissions to


hospitals


in [place] throughout his life. Patient also reports having  outpatient


services


through [facility].  Patient confirms with this physician he is treated at [facility].  Patient does not meet criteria for psychiatric hospitalization


at this time.


History of Inpatient Treatment:  Yes


Guns or firearms in home:  No





Social History


Hx Alcohol Use:  No


Hx Tobacco Use:  No


Hx Substance Use:  Yes


Substance Use Type:  Marijuana, Nicotine/Cigarettes


Other Substances Used:  in the past-denies now


Hx of Substance Use Treatment:  Yes





Allergies-Medications


(Allergen,Severity, Reaction):  


Coded Allergies:  


     No Known Allergies (Verified , 6/23/17)


 Per MAR faxed by [email or web address] [phone].


Reported Meds & Prescriptions





Reported Meds & Active Scripts


Active


Depakote ER (Divalproex Sodium) 500 Mg Michael 1,000 Mg PO HS


Trihexyphenidyl (Trihexyphenidyl HCl) 2 Mg Tab 2 Mg PO BID


Trazodone (Trazodone HCl) 50 Mg Tab 100 Mg PO HS


Haloperidol 5 Mg Tab 5 Mg PO DAILY


Haloperidol 10 Mg Tab 10 Mg PO HS


Meloxicam 7.5 Mg Tab 7.5 Mg PO DAILY


Gabapentin 100 Mg Cap 100 Mg PO TID





Review of Systems


Except as stated in HPI:  all other systems reviewed are Neg





Exam


Alert:  Yes


[place]:  Person, Place, Date


Mood:  Calm


Affect:  Appropriate


Speech:  Clear, Logical


Eye Contact:  Normal


Memory Intact:  Immediate, Recent, Remote


Insight/Judgement


Baseline





MDM


Medical Decision Making


Medical Record Reviewed:  Yes


Assessment/Plan


Patient interviewed at bedside, record reviewed and case discussed with nurse.  

Patient does not meet criteria for Calderon act or involuntary psychiatric 

hospitalization or voluntary psychiatric hospitalization.  Patient needs his 

[facility]  to assist him with moving to a different group home.

  Patient would not discuss why he wants to change group homes with this 

physician.  He is willing to verbally contract for safety and he has competent 

to do so.





Orders





 Orders


Complete Blood Count With Diff (8/26/17 10:58)


Comprehensive Metabolic Panel (8/26/17 10:58)


Psych Screen (8/26/17 10:58)


Drug Screen, Random Urine (8/26/17 10:58)


Alcohol (Ethanol) (8/26/17 10:58)


Valproic Acid (Depakene) (8/26/17 10:58)





Results





Vital Signs








  Date Time  Temp Pulse Resp B/P (MAP) Pulse Ox O2 Delivery O2 Flow Rate FiO2


 


8/26/17 09:12 98.0 74 20 152/93 (112) 97 Room Air  











Laboratory Tests








Test


  8/26/17


11:43


 


White Blood Count 5.2 


 


Red Blood Count 4.59 


 


Hemoglobin 14.1 


 


Hematocrit 41.6 


 


Mean Corpuscular Volume 90.5 


 


Mean Corpuscular Hemoglobin 30.7 


 


Mean Corpuscular Hemoglobin


Concent 33.9 


 


 


Red Cell Distribution Width 14.8 


 


Platelet Count 183 


 


Mean Platelet Volume 7.2 


 


Neutrophils (%) (Auto) 50.0 


 


Lymphocytes (%) (Auto) 39.7 


 


Monocytes (%) (Auto) 7.4 


 


Eosinophils (%) (Auto) 2.3 


 


Basophils (%) (Auto) 0.6 


 


Neutrophils # (Auto) 2.6 


 


Lymphocytes # (Auto) 2.1 


 


Monocytes # (Auto) 0.4 


 


Eosinophils # (Auto) 0.1 


 


Basophils # (Auto) 0.0 


 


CBC Comment DIFF FINAL 


 


Differential Comment  


 


Blood Urea Nitrogen 11 


 


Creatinine 0.82 


 


Random Glucose 80 


 


Total Protein 7.3 


 


Albumin 3.5 


 


Calcium Level 8.2 


 


Alkaline Phosphatase 49 


 


Aspartate Amino Transf


(AST/SGOT) 17 


 


 


Alanine Aminotransferase


(ALT/SGPT) 25 


 


 


Total Bilirubin 0.4 


 


Sodium Level 141 


 


Potassium Level 3.6 


 


Chloride Level 104 


 


Carbon Dioxide Level 30.9 


 


Anion Gap 6 


 


Estimat Glomerular Filtration


Rate 95 


 


 


Urine Opiates Screen NEG 


 


Urine Barbiturates Screen NEG 


 


Urine Amphetamines Screen NEG 


 


Urine Benzodiazepines Screen NEG 


 


Urine Cocaine Screen NEG 


 


Urine Cannabinoids Screen NEG 


 


Ethyl Alcohol Level LESS THAN 3 











Diagnosis





 Primary Impression:  


 Adjustment disorder with anxiety


Condition:  Stable











Salo Elizabeth MD Aug 26, 2017 15:17

## 2017-08-26 NOTE — PD
HPI


.


Psychosis


Chief Complaint:  Psychiatric Symptoms


Time Seen by Provider:  10:10


Travel History


International Travel<30 days:  No


Contact w/Intl Traveler<30days:  No


Traveled to known affect area:  No





History of Present Illness


HPI


This patient presents with a chief complaint of increased psychotic symptoms 

including hallucinations and paranoia.  He states that he has been compliant 

with his medications.  He does not feel that they are effective.  In addition, 

the patient would like to be moved from his current assisted living facility.  

He currently resides in [facility].  He denies suicidal ideation.





PFSH


Past Medical History


Hx Anticoagulant Therapy:  No


Arthritis:  Yes (IN LEGS )


Autoimmune Disease:  No


Blood Disorders:  No


Anxiety:  Yes


Depression:  Yes


Cardiovascular Problems:  No


Chemotherapy:  No


Chest Pain:  No


Congestive Heart Failure:  No


COPD:  Yes


Cerebrovascular Accident:  No


Diabetes:  No


Diminished Hearing:  No


Endocrine:  No


Gastrointestinal Disorders:  No


GERD:  No


Genitourinary:  No


Hiatal Hernia:  No


Immune Disorder:  No


Implanted Vascular Access Dvce:  No


Kidney Stones:  No


Musculoskeletal:  Yes


Neurologic:  No


Psychiatric:  Yes (Schizophrenia)


Reproductive:  No


Respiratory:  No


Immunizations Current:  Yes


Migraines:  No


Radiation Therapy:  No


Renal Failure:  No


Schizophrenia:  Yes


Sickle Cell Disease:  No


Thyroid Disease:  No


Ulcer:  No





Past Surgical History


Abdominal Surgery:  Yes (GALL BLADDER REMOVED)


AICD:  No


Arteriovenous Shunt:  No


Cardiac Surgery:  No


Cholecystectomy:  Yes


Ear Surgery:  No


Endocrine Surgery:  No


Eye Surgery:  No


Genitourinary Surgery:  No


Gynecologic Surgery:  No


Insulin Pump:  No


Joint Replacement:  No


Oral Surgery:  No


Pacemaker:  No


Thoracic Surgery:  No


Other Surgery:  Yes





Social History


Alcohol Use:  No


Tobacco Use:  No


Substance Use:  Yes





Allergies-Medications


(Allergen,Severity, Reaction):  


Coded Allergies:  


     No Known Allergies (Verified , 6/23/17)


 Per MAR faxed by [facility] [phone].


Reported Meds & Prescriptions





Reported Meds & Active Scripts


Active


Depakote ER (Divalproex Sodium) 500 Mg Michael 1,000 Mg PO HS


Trihexyphenidyl (Trihexyphenidyl HCl) 2 Mg Tab 2 Mg PO BID


Trazodone (Trazodone HCl) 50 Mg Tab 100 Mg PO HS


Haloperidol 5 Mg Tab 5 Mg PO DAILY


Haloperidol 10 Mg Tab 10 Mg PO HS


Meloxicam 7.5 Mg Tab 7.5 Mg PO DAILY


Gabapentin 100 Mg Cap 100 Mg PO TID








Review of Systems


Except as stated in HPI:  all other systems reviewed are Neg


Psychiatric:  Positive: Disorder of Thought, No: Suicidal Ideations





Physical Exam


Narrative


GENERAL: The patient is watching TV in no distress.


SKIN: Warm and dry.


HEAD: Atraumatic. Normocephalic. 


EYES: Pupils equal and round. 


ENT: No nasal bleeding or discharge.  Mucous membranes pink and moist.


NECK: Trachea midline.  


CARDIOVASCULAR: Regular rate and rhythm.  


RESPIRATORY: No accessory muscle use. 


GASTROINTESTINAL: Abdomen soft, non-tender, nondistended. 


MUSCULOSKELETAL: No obvious deformities.   No edema. 


NEUROLOGICAL: Awake and alert. No obvious cranial nerve deficits.  Motor 

grossly within normal limits. Normal speech.


PSYCHIATRIC: Good eye contact with the examiner.  He does not appear to be 

responding to any internal stimuli.





Data


Data


Last Documented VS





Vital Signs








  Date Time  Temp Pulse Resp B/P (MAP) Pulse Ox O2 Delivery O2 Flow Rate FiO2


 


8/26/17 09:12 98.0 74 20 152/93 (112) 97 Room Air  








Orders





 Orders


Complete Blood Count With Diff (8/26/17 10:58)


Comprehensive Metabolic Panel (8/26/17 10:58)


Psych Screen (8/26/17 10:58)


Drug Screen, Random Urine (8/26/17 10:58)


Alcohol (Ethanol) (8/26/17 10:58)


Valproic Acid (Depakene) (8/26/17 10:58)





Labs





Laboratory Tests








Test


  8/26/17


11:43


 


White Blood Count 5.2 TH/MM3 


 


Red Blood Count 4.59 MIL/MM3 


 


Hemoglobin 14.1 GM/DL 


 


Hematocrit 41.6 % 


 


Mean Corpuscular Volume 90.5 FL 


 


Mean Corpuscular Hemoglobin 30.7 PG 


 


Mean Corpuscular Hemoglobin


Concent 33.9 % 


 


 


Red Cell Distribution Width 14.8 % 


 


Platelet Count 183 TH/MM3 


 


Mean Platelet Volume 7.2 FL 


 


Neutrophils (%) (Auto) 50.0 % 


 


Lymphocytes (%) (Auto) 39.7 % 


 


Monocytes (%) (Auto) 7.4 % 


 


Eosinophils (%) (Auto) 2.3 % 


 


Basophils (%) (Auto) 0.6 % 


 


Neutrophils # (Auto) 2.6 TH/MM3 


 


Lymphocytes # (Auto) 2.1 TH/MM3 


 


Monocytes # (Auto) 0.4 TH/MM3 


 


Eosinophils # (Auto) 0.1 TH/MM3 


 


Basophils # (Auto) 0.0 TH/MM3 


 


CBC Comment DIFF FINAL 


 


Differential Comment  


 


Blood Urea Nitrogen 11 MG/DL 


 


Creatinine 0.82 MG/DL 


 


Random Glucose 80 MG/DL 


 


Total Protein 7.3 GM/DL 


 


Albumin 3.5 GM/DL 


 


Calcium Level 8.2 MG/DL 


 


Alkaline Phosphatase 49 U/L 


 


Aspartate Amino Transf


(AST/SGOT) 17 U/L 


 


 


Alanine Aminotransferase


(ALT/SGPT) 25 U/L 


 


 


Total Bilirubin 0.4 MG/DL 


 


Sodium Level 141 MEQ/L 


 


Potassium Level 3.6 MEQ/L 


 


Chloride Level 104 MEQ/L 


 


Carbon Dioxide Level 30.9 MEQ/L 


 


Anion Gap 6 MEQ/L 


 


Estimat Glomerular Filtration


Rate 95 ML/MIN 


 


 


Urine Opiates Screen NEG 


 


Urine Barbiturates Screen NEG 


 


Urine Amphetamines Screen NEG 


 


Urine Benzodiazepines Screen NEG 


 


Urine Cocaine Screen NEG 


 


Urine Cannabinoids Screen NEG 


 


Ethyl Alcohol Level


  LESS THAN 3


MG/DL











MDM


Medical Decision Making


Medical Screen Exam Complete:  Yes


Emergency Medical Condition:  Yes


Medical Record Reviewed:  Yes (patient was here about a week ago with similar 

symptoms.  Haldol was added to his medication regimen.  His mental status 

returned to baseline and he was discharged back to his assisted living facility.

)


Differential Diagnosis


Differential diagnosis of psychosis includes but is not limited to schizophrenia

, schizoaffective disorder, bipolar disorder, intoxication, substance abuse, 

dementia


Narrative Course


Patient presents reporting increased psychotic features.  He is also unhappy 

with his current living situation.  I have ordered a medical screening exam 

which will be followed by a psychiatric consultation.





CBC & BMP Diagram


8/26/17 11:43








Total Protein 7.3, Albumin 3.5, Calcium Level 8.2 L, Alkaline Phosphatase 49, 

Aspartate Amino Transf (AST/SGOT) 17, Alanine Aminotransferase (ALT/SGPT) 25, 

Total Bilirubin 0.4





Urine drug screen is negative.  Alcohol level is negative.





This patient is medically clear for psychiatric evaluation.





Diagnosis





 Primary Impression:  


 Medical clearance for psychiatric admission


Condition:  Stable











Barb Gillis MD Aug 26, 2017 10:39

## 2017-09-08 ENCOUNTER — HOSPITAL ENCOUNTER (INPATIENT)
Dept: HOSPITAL 17 - NEPD | Age: 62
LOS: 5 days | Discharge: NURSING, REHAB OR LONG-TERM CARE | DRG: 885 | End: 2017-09-13
Attending: PSYCHIATRY & NEUROLOGY | Admitting: PSYCHIATRY & NEUROLOGY
Payer: COMMERCIAL

## 2017-09-08 VITALS
OXYGEN SATURATION: 97 % | SYSTOLIC BLOOD PRESSURE: 135 MMHG | TEMPERATURE: 97.2 F | HEART RATE: 67 BPM | RESPIRATION RATE: 18 BRPM | DIASTOLIC BLOOD PRESSURE: 107 MMHG

## 2017-09-08 VITALS
OXYGEN SATURATION: 97 % | TEMPERATURE: 97.5 F | RESPIRATION RATE: 18 BRPM | DIASTOLIC BLOOD PRESSURE: 73 MMHG | SYSTOLIC BLOOD PRESSURE: 131 MMHG | HEART RATE: 55 BPM

## 2017-09-08 VITALS — RESPIRATION RATE: 16 BRPM | SYSTOLIC BLOOD PRESSURE: 138 MMHG | DIASTOLIC BLOOD PRESSURE: 86 MMHG | HEART RATE: 60 BPM

## 2017-09-08 VITALS — BODY MASS INDEX: 22.25 KG/M2 | WEIGHT: 158.95 LBS | HEIGHT: 71 IN

## 2017-09-08 DIAGNOSIS — M19.90: ICD-10-CM

## 2017-09-08 DIAGNOSIS — Z91.5: ICD-10-CM

## 2017-09-08 DIAGNOSIS — R45.851: ICD-10-CM

## 2017-09-08 DIAGNOSIS — F20.0: Primary | ICD-10-CM

## 2017-09-08 DIAGNOSIS — J44.9: ICD-10-CM

## 2017-09-08 DIAGNOSIS — F17.210: ICD-10-CM

## 2017-09-08 LAB
ANION GAP SERPL CALC-SCNC: 6 MEQ/L (ref 5–15)
BASOPHILS # BLD AUTO: 0 TH/MM3 (ref 0–0.2)
BASOPHILS NFR BLD: 0.5 % (ref 0–2)
BUN SERPL-MCNC: 26 MG/DL (ref 7–18)
CHLORIDE SERPL-SCNC: 103 MEQ/L (ref 98–107)
EOSINOPHIL # BLD: 0.1 TH/MM3 (ref 0–0.4)
EOSINOPHIL NFR BLD: 2 % (ref 0–4)
ERYTHROCYTE [DISTWIDTH] IN BLOOD BY AUTOMATED COUNT: 14.6 % (ref 11.6–17.2)
ETHANOL SERPL-MCNC: (no result) MG/DL (ref 0–5)
GFR SERPLBLD BASED ON 1.73 SQ M-ARVRAT: 102 ML/MIN (ref 89–?)
HCO3 BLD-SCNC: 29.5 MEQ/L (ref 21–32)
HCT VFR BLD CALC: 40.3 % (ref 39–51)
HEMO FLAGS: (no result)
LYMPHOCYTES # BLD AUTO: 3.3 TH/MM3 (ref 1–4.8)
LYMPHOCYTES NFR BLD AUTO: 50.3 % (ref 9–44)
MCH RBC QN AUTO: 30.6 PG (ref 27–34)
MCHC RBC AUTO-ENTMCNC: 33.7 % (ref 32–36)
MCV RBC AUTO: 90.7 FL (ref 80–100)
MONOCYTES NFR BLD: 7.3 % (ref 0–8)
NEUTROPHILS # BLD AUTO: 2.6 TH/MM3 (ref 1.8–7.7)
NEUTROPHILS NFR BLD AUTO: 39.9 % (ref 16–70)
PLATELET # BLD: 173 TH/MM3 (ref 150–450)
POTASSIUM SERPL-SCNC: 4 MEQ/L (ref 3.5–5.1)
RBC # BLD AUTO: 4.44 MIL/MM3 (ref 4.5–5.9)
SODIUM SERPL-SCNC: 138 MEQ/L (ref 136–145)
WBC # BLD AUTO: 6.6 TH/MM3 (ref 4–11)

## 2017-09-08 PROCEDURE — 93005 ELECTROCARDIOGRAM TRACING: CPT

## 2017-09-08 PROCEDURE — 80061 LIPID PANEL: CPT

## 2017-09-08 PROCEDURE — 80053 COMPREHEN METABOLIC PANEL: CPT

## 2017-09-08 PROCEDURE — 82306 VITAMIN D 25 HYDROXY: CPT

## 2017-09-08 PROCEDURE — 83036 HEMOGLOBIN GLYCOSYLATED A1C: CPT

## 2017-09-08 PROCEDURE — 84443 ASSAY THYROID STIM HORMONE: CPT

## 2017-09-08 PROCEDURE — 85025 COMPLETE CBC W/AUTO DIFF WBC: CPT

## 2017-09-08 PROCEDURE — 80307 DRUG TEST PRSMV CHEM ANLYZR: CPT

## 2017-09-08 PROCEDURE — 82607 VITAMIN B-12: CPT

## 2017-09-08 PROCEDURE — 80048 BASIC METABOLIC PNL TOTAL CA: CPT

## 2017-09-08 NOTE — PD
HPI


Chief Complaint:  ba


Time Seen by Provider:  03:38


Travel History


International Travel<30 days:  No


Contact w/Intl Traveler<30days:  No


Traveled to known affect area:  No





History of Present Illness


HPI


61-year-old male with history of COPD, hypertension, schizophrenia, presents to 

emergency department under [place] act for psychiatric evaluation.  Patient 

states that he has been hearing voices.  He has been having more hallucinations 

and they are starting to scare him.  He cannot sleep.  States he has been 

taking his medication on and off.  Does not recall the name of his medications.

  Denies suicidal or homicidal ideations.  No other symptoms to report.





PFSH


Past Medical History


Hx Anticoagulant Therapy:  No


Arthritis:  Yes (IN LEGS )


Autoimmune Disease:  No


Blood Disorders:  No


Anxiety:  Yes


Depression:  Yes


Cardiovascular Problems:  No


Chemotherapy:  No


Chest Pain:  No


Congestive Heart Failure:  No


COPD:  Yes


Cerebrovascular Accident:  No


Diabetes:  No


Diminished Hearing:  No


Endocrine:  No


Gastrointestinal Disorders:  No


GERD:  No


Genitourinary:  No


Hiatal Hernia:  No


Immune Disorder:  No


Implanted Vascular Access Dvce:  No


Kidney Stones:  No


Musculoskeletal:  Yes


Neurologic:  No


Psychiatric:  Yes (Schizophrenia)


Reproductive:  No


Respiratory:  No


Immunizations Current:  Yes


Migraines:  No


Radiation Therapy:  No


Renal Failure:  No


Schizophrenia:  Yes


Sickle Cell Disease:  No


Thyroid Disease:  No


Ulcer:  No





Past Surgical History


Abdominal Surgery:  Yes (GALL BLADDER REMOVED)


AICD:  No


Arteriovenous Shunt:  No


Cardiac Surgery:  No


Cholecystectomy:  Yes


Ear Surgery:  No


Endocrine Surgery:  No


Eye Surgery:  No


Genitourinary Surgery:  No


Gynecologic Surgery:  No


Insulin Pump:  No


Joint Replacement:  No


Oral Surgery:  No


Pacemaker:  No


Thoracic Surgery:  No


Other Surgery:  Yes





Social History


Alcohol Use:  No


Tobacco Use:  No


Substance Use:  Yes





Allergies-Medications


(Allergen,Severity, Reaction):  


Coded Allergies:  


     No Known Allergies (Verified , 6/23/17)


 Per MAR faxed by [employer] [phone].


Reported Meds & Prescriptions





Reported Meds & Active Scripts


Active


Depakote ER (Divalproex Sodium) 500 Mg Michael 1,000 Mg PO HS


Trihexyphenidyl (Trihexyphenidyl HCl) 2 Mg Tab 2 Mg PO BID


Trazodone (Trazodone HCl) 50 Mg Tab 100 Mg PO HS


Haloperidol 5 Mg Tab 5 Mg PO DAILY


Haloperidol 10 Mg Tab 10 Mg PO HS


Meloxicam 7.5 Mg Tab 7.5 Mg PO DAILY


Gabapentin 100 Mg Cap 100 Mg PO TID








Review of Systems


Except as stated in HPI:  all other systems reviewed are Neg





Physical Exam


Narrative


GENERAL: Well-nourished elderly male patient, no acute distress


SKIN: Focused skin assessment warm/dry.


HEAD: Atraumatic. Normocephalic. 


EYES: Pupils equal and round. No scleral icterus. No injection or drainage. 


ENT: No nasal bleeding or discharge.  Mucous membranes pink and moist.


NECK: Trachea midline. No JVD. 


CARDIOVASCULAR: Regular rate and rhythm.  No murmur appreciated.


RESPIRATORY: No accessory muscle use.  Coarse, diminished bases to 

auscultation. Breath sounds equal bilaterally. 


GASTROINTESTINAL: Abdomen soft, non-tender, nondistended. Hepatic and splenic 

margins not palpable. 


MUSCULOSKELETAL: No obvious deformities. No clubbing.  No cyanosis.  No edema. 


NEUROLOGICAL: Awake and alert. No obvious cranial nerve deficits.  Motor 

grossly within normal limits. Normal speech.





Data


Data


Orders





 Orders


Complete Blood Count With Diff (9/8/17 03:38)


Basic Metabolic Panel (Bmp) (9/8/17 03:38)


Psych Screen (9/8/17 03:38)


Drug Screen, Random Urine (9/8/17 03:38)


Alcohol (Ethanol) (9/8/17 03:38)








MDM


Medical Decision Making


Medical Screen Exam Complete:  Yes


Emergency Medical Condition:  Yes


Medical Record Reviewed:  Yes


Differential Diagnosis


Mood disorder versus personality disorder versus adjustment reaction disorder 

versus substance abuse


Narrative Course


61-year-old male presents to emergency department under [place] act for 

psychiatric evaluation.  Patient appears without distress.  He states he is 

having more more auditory hallucinations.  Sees that he needs his medication 

looked at despite not taking it correctly.  Lab work is initiated.  Pending no 

acute abnormality, patient is medically cleared to undergo psychiatric 

screening for further evaluation and disposition.





Diagnosis





 Primary Impression:  


 Acute exacerbation of chronic paranoid schizophrenia


Condition:  Stable











Mady Carrasco Sep 8, 2017 03:39
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
## 2017-09-09 VITALS
OXYGEN SATURATION: 98 % | DIASTOLIC BLOOD PRESSURE: 61 MMHG | SYSTOLIC BLOOD PRESSURE: 103 MMHG | RESPIRATION RATE: 16 BRPM | TEMPERATURE: 98.6 F | HEART RATE: 70 BPM

## 2017-09-09 VITALS
RESPIRATION RATE: 16 BRPM | OXYGEN SATURATION: 96 % | HEART RATE: 99 BPM | SYSTOLIC BLOOD PRESSURE: 107 MMHG | DIASTOLIC BLOOD PRESSURE: 70 MMHG | TEMPERATURE: 98 F

## 2017-09-09 LAB
ALP SERPL-CCNC: 43 U/L (ref 45–117)
ALT SERPL-CCNC: 30 U/L (ref 12–78)
ANION GAP SERPL CALC-SCNC: 6 MEQ/L (ref 5–15)
AST SERPL-CCNC: 19 U/L (ref 15–37)
BASOPHILS # BLD AUTO: 0 TH/MM3 (ref 0–0.2)
BASOPHILS NFR BLD: 0.7 % (ref 0–2)
BILIRUB SERPL-MCNC: 0.4 MG/DL (ref 0.2–1)
BUN SERPL-MCNC: 20 MG/DL (ref 7–18)
CHLORIDE SERPL-SCNC: 103 MEQ/L (ref 98–107)
EOSINOPHIL # BLD: 0.1 TH/MM3 (ref 0–0.4)
EOSINOPHIL NFR BLD: 2.4 % (ref 0–4)
ERYTHROCYTE [DISTWIDTH] IN BLOOD BY AUTOMATED COUNT: 14.8 % (ref 11.6–17.2)
GFR SERPLBLD BASED ON 1.73 SQ M-ARVRAT: 124 ML/MIN (ref 89–?)
HCO3 BLD-SCNC: 30.8 MEQ/L (ref 21–32)
HCT VFR BLD CALC: 41.2 % (ref 39–51)
HDLC SERPL-MCNC: 33.8 MG/DL (ref 40–60)
HEMO FLAGS: (no result)
HEMOGLOBIN A1A: 1 %
HEMOGLOBIN A1B: 0.7 %
HEMOGLOBIN AO: 85.8 %
HEMOGLOBIN LA1C: 1.9 %
HEMOGLOBIN P3: 3.7 %
HGB F MFR BLD: 1 %
LDLC SERPL-MCNC: 63 MG/DL (ref 0–99)
LYMPHOCYTES # BLD AUTO: 2 TH/MM3 (ref 1–4.8)
LYMPHOCYTES NFR BLD AUTO: 34.2 % (ref 9–44)
MCH RBC QN AUTO: 30.8 PG (ref 27–34)
MCHC RBC AUTO-ENTMCNC: 34.2 % (ref 32–36)
MCV RBC AUTO: 89.9 FL (ref 80–100)
MONOCYTES NFR BLD: 9.2 % (ref 0–8)
NEUTROPHILS # BLD AUTO: 3.2 TH/MM3 (ref 1.8–7.7)
NEUTROPHILS NFR BLD AUTO: 53.5 % (ref 16–70)
PLATELET # BLD: 158 TH/MM3 (ref 150–450)
POTASSIUM SERPL-SCNC: 3.8 MEQ/L (ref 3.5–5.1)
RBC # BLD AUTO: 4.58 MIL/MM3 (ref 4.5–5.9)
SODIUM SERPL-SCNC: 140 MEQ/L (ref 136–145)
VIT B12 SERPL-MCNC: 476 PG/ML (ref 193–986)
WBC # BLD AUTO: 6 TH/MM3 (ref 4–11)

## 2017-09-09 NOTE — EKG
Date Performed: 09/09/2017       Time Performed: 09:03:54

 

PTAGE:      61 years

 

EKG:      SINUS BRADYCARDIA NONSPECIFIC T-WAVE ABNORMALITY Compared to prior tracing no significant c
hange ABNORMAL ECG

 

PREVIOUS TRACING       : 08/02/2017 20.26

 

DOCTOR:   Moe Kelly  Interpretating Date/Time  09/09/2017 13:28:30

## 2017-09-09 NOTE — HHI.PYPN
Subjective


Remarks


Patient seen, case discussed with nurse and labs reviewed.  Remains reclusive, 

complaining of auditory hallucinations, suicidality and paranoia.





Review of Systems


Except as stated in HPI:  all other systems reviewed are Neg





Objective


Alert:  Yes


[place]:  Person, Place, Date


Mood:  Anxious


Affect:  Restricted


Memory Intact:  Immediate


Hallucinations:  Auditory


Delusions:  Yes


Delusion Type:  Paranoid, Other


Suicidal:  Ideation


Homicidal:  Ideation (no)


Insight/Judgment


Impaired


Labs











Test


  9/9/17


11:02


 


White Blood Count 6.0 TH/MM3 


 


Red Blood Count 4.58 MIL/MM3 


 


Hemoglobin 14.1 GM/DL 


 


Hematocrit 41.2 % 


 


Mean Corpuscular Volume 89.9 FL 


 


Mean Corpuscular Hemoglobin 30.8 PG 


 


Mean Corpuscular Hemoglobin


Concent 34.2 % 


 


 


Red Cell Distribution Width 14.8 % 


 


Platelet Count 158 TH/MM3 


 


Mean Platelet Volume 8.1 FL 


 


Neutrophils (%) (Auto) 53.5 % 


 


Lymphocytes (%) (Auto) 34.2 % 


 


Monocytes (%) (Auto) 9.2 % 


 


Eosinophils (%) (Auto) 2.4 % 


 


Basophils (%) (Auto) 0.7 % 


 


Neutrophils # (Auto) 3.2 TH/MM3 


 


Lymphocytes # (Auto) 2.0 TH/MM3 


 


Monocytes # (Auto) 0.5 TH/MM3 


 


Eosinophils # (Auto) 0.1 TH/MM3 


 


Basophils # (Auto) 0.0 TH/MM3 


 


CBC Comment DIFF FINAL 


 


Differential Comment  


 


Blood Urea Nitrogen 20 MG/DL 


 


Creatinine 0.65 MG/DL 


 


Random Glucose 86 MG/DL 


 


Albumin 3.1 GM/DL 


 


Calcium Level 8.3 MG/DL 


 


Aspartate Amino Transf


(AST/SGOT) 19 U/L 


 


 


Alanine Aminotransferase


(ALT/SGPT) 30 U/L 


 


 


Sodium Level 140 MEQ/L 


 


Potassium Level 3.8 MEQ/L 


 


Chloride Level 103 MEQ/L 


 


Carbon Dioxide Level 30.8 MEQ/L 


 


Anion Gap 6 MEQ/L 


 


Estimat Glomerular Filtration


Rate 124 ML/MIN 


 


 


Triglycerides Level 197 MG/DL 


 


Cholesterol Level 136 MG/DL 








Vitals/IOs





Vital Signs








  Date Time  Temp Pulse Resp B/P (MAP) Pulse Ox O2 Delivery O2 Flow Rate FiO2


 


9/9/17 06:34 98.0 99 16 107/70 (82) 96   


 


9/8/17 09:47      Room Air  











Assessment & Plan


Problem List:  


(1) Schizophrenia, paranoid, chronic


ICD Codes:  F20.0 - Paranoid schizophrenia


Status:  Acute


Assessment & Plan


Estimated LOS:  days patient to be observed and evaluated on current 

antipsychotic regimen.  Will assess for efficacy and tolerability.


Justification for Cont. Inpt.


Psychotic and suicidal.











Salo Elizabeth MD Sep 9, 2017 12:59

## 2017-09-10 VITALS
SYSTOLIC BLOOD PRESSURE: 121 MMHG | HEART RATE: 60 BPM | TEMPERATURE: 97.6 F | RESPIRATION RATE: 18 BRPM | DIASTOLIC BLOOD PRESSURE: 73 MMHG | OXYGEN SATURATION: 97 %

## 2017-09-10 VITALS
DIASTOLIC BLOOD PRESSURE: 62 MMHG | SYSTOLIC BLOOD PRESSURE: 88 MMHG | OXYGEN SATURATION: 95 % | RESPIRATION RATE: 19 BRPM | TEMPERATURE: 97.7 F | HEART RATE: 101 BPM

## 2017-09-10 RX ADMIN — HYDROXYZINE HYDROCHLORIDE PRN MG: 50 TABLET, FILM COATED ORAL at 17:34

## 2017-09-10 NOTE — HHI.PYPN
Subjective


Remarks


Remains in bed and seclusive.  Patient seen, chart reviewed and case discussed 

with nursing.  Patient admits to auditory hallucinations and paranoia but 

states they are slightly improved.





Review of Systems


Except as stated in HPI:  all other systems reviewed are Neg





Objective


Alert:  Yes


[place]:  Person, Place, Date, Situation


Mood:  Anxious


Affect:  Restricted


Memory Intact:  Immediate, Recent, Remote


Hallucinations:  Auditory


Delusions:  Yes


Delusion Type:  Paranoid, Other


Suicidal:  Ideation


Homicidal:  Ideation


Insight/Judgment


Impaired


Vitals/IOs





Vital Signs








  Date Time  Temp Pulse Resp B/P (MAP) Pulse Ox O2 Delivery O2 Flow Rate FiO2


 


9/10/17 07:06 97.6 60 18 121/73 (89) 97   


 


9/8/17 09:47      Room Air  














Intake and Output   


 


 9/10/17 9/10/17 9/10/17





 07:59 15:59 23:59


 


Intake Total 480 ml 360 ml 


 


Balance 480 ml 360 ml 











Assessment & Plan


Problem List:  


(1) Schizophrenia, paranoid, chronic


ICD Codes:  F20.0 - Paranoid schizophrenia


Status:  Acute


Assessment & Plan


Estimated LOS:  days patient requires more time on antipsychotic medication.  

He remains paranoid with auditory hallucinations and suicidal.


Justification for Cont. Inpt.


Suicidal ideation with plan to overdose.











Salo Elizabeth MD Sep 10, 2017 15:20

## 2017-09-11 VITALS
DIASTOLIC BLOOD PRESSURE: 69 MMHG | SYSTOLIC BLOOD PRESSURE: 125 MMHG | OXYGEN SATURATION: 98 % | RESPIRATION RATE: 17 BRPM | TEMPERATURE: 98.2 F | HEART RATE: 65 BPM

## 2017-09-11 VITALS
DIASTOLIC BLOOD PRESSURE: 69 MMHG | TEMPERATURE: 97.6 F | RESPIRATION RATE: 17 BRPM | HEART RATE: 56 BPM | OXYGEN SATURATION: 97 % | SYSTOLIC BLOOD PRESSURE: 120 MMHG

## 2017-09-11 RX ADMIN — TRIHEXYPHENIDYL HYDROCHLORIDE SCH MG: 2 TABLET ORAL at 21:55

## 2017-09-11 RX ADMIN — DIVALPROEX SODIUM SCH MG: 500 TABLET, EXTENDED RELEASE ORAL at 21:55

## 2017-09-11 RX ADMIN — MIRTAZAPINE SCH MG: 15 TABLET, FILM COATED ORAL at 21:55

## 2017-09-11 RX ADMIN — HALOPERIDOL SCH MG: 10 TABLET ORAL at 21:55

## 2017-09-11 NOTE — HHI.PYPN
Subjective


Remarks


Patient seen and examined with nurse in coverage.  Chart reviewed.  I note the 

patient is presently on no scheduled psychotropics.  Case discussed with 

nursing staff.  Patient's Baker act  this morning.  Happily, he is 

willing to sign voluntary.  On my examination today, the patient reports that 

he remains a little bit paranoid.  He denies any SI, HI or AVH.  Denies any 

chest pain, shortness of breath or other physical complaints.  He is agreeable 

to resuming medications as ordered during his recent hospitalization in August 

of this year.





Review of Systems


ROS Limitations:  Psychotic, Poor Historian


Except as stated in HPI:  all other systems reviewed are Neg





Objective


Alert:  Yes


[place]:  Person, Place, Date


Mood:  Calm


Affect:  Flat


Memory Intact:  Comment (not formally assessed)


Hallucinations:  Other (denies AVH)


Delusions:  Yes


Delusion Type:  Paranoid


Suicidal:  Ideation (denies SI)


Homicidal:  Ideation (denies HI)


Insight/Judgment


Poor


Remarks


No motor abnormalities noted.


Labs


Labs reviewed.


Vitals/IOs





Vital Signs








  Date Time  Temp Pulse Resp B/P (MAP) Pulse Ox O2 Delivery O2 Flow Rate FiO2


 


17 06:20 97.6 56 17 120/69 (86) 97   


 


17 09:47      Room Air  











Assessment & Plan


Problem List:  


(1) Schizophrenia, paranoid, chronic


ICD Codes:  F20.0 - Paranoid schizophrenia


Status:  Acute


Assessment & Plan


Resume psychotropics from previous admission including Depakote 1 g at bedtime, 

Remeron 15 mg at bedtime, Haldol 5/10 mg, trazodone 100 mg at bedtime and 

Artane 2 mg twice a day.  Continue to monitor on the inpatient unit.  Continue 

other medications and care as ordered.  Patient may sign voluntary.


Justification for Cont. Inpt.


Med changes.  Risk for decompensation.  Impairment in reality construction.


Discharge Planning


Pending psychiatric stabilization


Request HC Surrog/Guard Advoc?:  No











Tima Dowell MD Sep 11, 2017 09:48

## 2017-09-12 VITALS
TEMPERATURE: 97.8 F | SYSTOLIC BLOOD PRESSURE: 109 MMHG | RESPIRATION RATE: 16 BRPM | OXYGEN SATURATION: 97 % | HEART RATE: 55 BPM | DIASTOLIC BLOOD PRESSURE: 76 MMHG

## 2017-09-12 VITALS
DIASTOLIC BLOOD PRESSURE: 64 MMHG | TEMPERATURE: 98.2 F | RESPIRATION RATE: 17 BRPM | OXYGEN SATURATION: 98 % | SYSTOLIC BLOOD PRESSURE: 111 MMHG | HEART RATE: 63 BPM

## 2017-09-12 RX ADMIN — DIVALPROEX SODIUM SCH MG: 500 TABLET, EXTENDED RELEASE ORAL at 21:45

## 2017-09-12 RX ADMIN — MIRTAZAPINE SCH MG: 15 TABLET, FILM COATED ORAL at 21:45

## 2017-09-12 RX ADMIN — HALOPERIDOL SCH MG: 10 TABLET ORAL at 21:45

## 2017-09-12 RX ADMIN — TRIHEXYPHENIDYL HYDROCHLORIDE SCH MG: 2 TABLET ORAL at 21:44

## 2017-09-12 RX ADMIN — HALOPERIDOL SCH MG: 5 TABLET ORAL at 09:34

## 2017-09-12 RX ADMIN — HYDROXYZINE HYDROCHLORIDE PRN MG: 50 TABLET, FILM COATED ORAL at 21:45

## 2017-09-12 RX ADMIN — TRIHEXYPHENIDYL HYDROCHLORIDE SCH MG: 2 TABLET ORAL at 09:35

## 2017-09-12 NOTE — HHI.PYPN
Subjective


Remarks


Patient seen in his room with nurse Leora and counselor Claudia, chart review, 

patient compliant medications.  Patient calm pleasant today sitting the 

auditory hallucinations are diminishing.  He does denies suicidality at this 

time, patient is been no behavioral problems.  He does wish to return to 

[facility] when she is recovered sufficiently.  For now continue treatment





Review of Systems


Except as stated in HPI:  all other systems reviewed are Neg





Objective


Alert:  Yes


[place]:  Person, Place, Date


Mood:  Calm


Affect:  Flat


Memory Intact:  Comment (not formally assessed)


Hallucinations:  Other (denies AVH)


Delusions:  Yes


Delusion Type:  Paranoid


Suicidal:  Ideation (denies SI)


Homicidal:  Ideation (denies HI)


Insight/Judgment


Poor


Vitals/IOs





Vital Signs








  Date Time  Temp Pulse Resp B/P (MAP) Pulse Ox O2 Delivery O2 Flow Rate FiO2


 


9/12/17 05:52 97.8 55 16 109/76 (87) 97   


 


9/8/17 09:47      Room Air  











Assessment & Plan


Problem List:  


(1) Schizophrenia, paranoid, chronic


ICD Codes:  F20.0 - Paranoid schizophrenia


Status:  Acute


Assessment & Plan


Estimated LOS:  days patient continue psychotic though the voices are 

diminishing, paranoia is markedly diminished also.  Patient compliant 

medications.  For now continue treatment


Justification for Cont. Inpt.


At this time patient will decompensate if not placed in an appropriate level of 

care


Discharge Planning


To be determined


Request HC Surrog/Guard Advoc?:  No











Edgardo Eli MD Sep 12, 2017 10:09

## 2017-09-13 VITALS
RESPIRATION RATE: 18 BRPM | SYSTOLIC BLOOD PRESSURE: 97 MMHG | DIASTOLIC BLOOD PRESSURE: 58 MMHG | OXYGEN SATURATION: 97 % | HEART RATE: 52 BPM | TEMPERATURE: 98.9 F

## 2017-09-13 RX ADMIN — HALOPERIDOL SCH MG: 5 TABLET ORAL at 09:00

## 2017-09-13 RX ADMIN — TRIHEXYPHENIDYL HYDROCHLORIDE SCH MG: 2 TABLET ORAL at 09:00
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
## 2017-09-18 ENCOUNTER — HOSPITAL ENCOUNTER (EMERGENCY)
Dept: HOSPITAL 17 - NEPD | Age: 62
LOS: 1 days | Discharge: NURSING, REHAB OR LONG-TERM CARE | End: 2017-09-19
Payer: COMMERCIAL

## 2017-09-18 VITALS — HEIGHT: 68 IN | WEIGHT: 154.32 LBS | BODY MASS INDEX: 23.39 KG/M2

## 2017-09-18 VITALS
RESPIRATION RATE: 17 BRPM | SYSTOLIC BLOOD PRESSURE: 135 MMHG | HEART RATE: 68 BPM | OXYGEN SATURATION: 98 % | DIASTOLIC BLOOD PRESSURE: 89 MMHG

## 2017-09-18 VITALS
RESPIRATION RATE: 20 BRPM | HEART RATE: 70 BPM | DIASTOLIC BLOOD PRESSURE: 92 MMHG | TEMPERATURE: 98.5 F | SYSTOLIC BLOOD PRESSURE: 138 MMHG | OXYGEN SATURATION: 97 %

## 2017-09-18 VITALS
DIASTOLIC BLOOD PRESSURE: 92 MMHG | OXYGEN SATURATION: 98 % | HEART RATE: 73 BPM | SYSTOLIC BLOOD PRESSURE: 131 MMHG | RESPIRATION RATE: 16 BRPM

## 2017-09-18 VITALS
RESPIRATION RATE: 18 BRPM | TEMPERATURE: 98 F | OXYGEN SATURATION: 96 % | DIASTOLIC BLOOD PRESSURE: 80 MMHG | SYSTOLIC BLOOD PRESSURE: 128 MMHG | HEART RATE: 80 BPM

## 2017-09-18 VITALS
SYSTOLIC BLOOD PRESSURE: 141 MMHG | RESPIRATION RATE: 18 BRPM | TEMPERATURE: 98.9 F | DIASTOLIC BLOOD PRESSURE: 79 MMHG | OXYGEN SATURATION: 99 % | HEART RATE: 50 BPM

## 2017-09-18 DIAGNOSIS — F20.0: Primary | ICD-10-CM

## 2017-09-18 DIAGNOSIS — Z79.899: ICD-10-CM

## 2017-09-18 LAB
ALP SERPL-CCNC: 48 U/L (ref 45–117)
ALT SERPL-CCNC: 27 U/L (ref 12–78)
ANION GAP SERPL CALC-SCNC: 2 MEQ/L (ref 5–15)
AST SERPL-CCNC: 14 U/L (ref 15–37)
BASOPHILS # BLD AUTO: 0 TH/MM3 (ref 0–0.2)
BASOPHILS NFR BLD: 0.6 % (ref 0–2)
BILIRUB SERPL-MCNC: 0.4 MG/DL (ref 0.2–1)
BUN SERPL-MCNC: 12 MG/DL (ref 7–18)
CHLORIDE SERPL-SCNC: 103 MEQ/L (ref 98–107)
EOSINOPHIL # BLD: 0.1 TH/MM3 (ref 0–0.4)
EOSINOPHIL NFR BLD: 2.3 % (ref 0–4)
ERYTHROCYTE [DISTWIDTH] IN BLOOD BY AUTOMATED COUNT: 14.4 % (ref 11.6–17.2)
ETHANOL SERPL-MCNC: (no result) MG/DL (ref 0–5)
GFR SERPLBLD BASED ON 1.73 SQ M-ARVRAT: 104 ML/MIN (ref 89–?)
HCO3 BLD-SCNC: 32.1 MEQ/L (ref 21–32)
HCT VFR BLD CALC: 39.1 % (ref 39–51)
HEMO FLAGS: (no result)
LYMPHOCYTES # BLD AUTO: 2.1 TH/MM3 (ref 1–4.8)
LYMPHOCYTES NFR BLD AUTO: 34.8 % (ref 9–44)
MCH RBC QN AUTO: 30.8 PG (ref 27–34)
MCHC RBC AUTO-ENTMCNC: 34.1 % (ref 32–36)
MCV RBC AUTO: 90.2 FL (ref 80–100)
MONOCYTES NFR BLD: 8.8 % (ref 0–8)
NEUTROPHILS # BLD AUTO: 3.2 TH/MM3 (ref 1.8–7.7)
NEUTROPHILS NFR BLD AUTO: 53.5 % (ref 16–70)
PLATELET # BLD: 195 TH/MM3 (ref 150–450)
POTASSIUM SERPL-SCNC: 3.8 MEQ/L (ref 3.5–5.1)
RBC # BLD AUTO: 4.34 MIL/MM3 (ref 4.5–5.9)
SODIUM SERPL-SCNC: 137 MEQ/L (ref 136–145)
WBC # BLD AUTO: 5.9 TH/MM3 (ref 4–11)

## 2017-09-18 PROCEDURE — 93005 ELECTROCARDIOGRAM TRACING: CPT

## 2017-09-18 PROCEDURE — 80164 ASSAY DIPROPYLACETIC ACD TOT: CPT

## 2017-09-18 PROCEDURE — 80307 DRUG TEST PRSMV CHEM ANLYZR: CPT

## 2017-09-18 PROCEDURE — 99284 EMERGENCY DEPT VISIT MOD MDM: CPT

## 2017-09-18 PROCEDURE — 80053 COMPREHEN METABOLIC PANEL: CPT

## 2017-09-18 PROCEDURE — 85025 COMPLETE CBC W/AUTO DIFF WBC: CPT

## 2017-09-18 NOTE — EKG
Date Performed: 09/18/2017       Time Performed: 10:40:30

 

PTAGE:      61 years

 

EKG:      Sinus rhythm 

 

 NORMAL ECG

 

PREVIOUS TRACING       : 09/09/2017 09.03 Compared to previous tracing, nonspecific T wave changes ha
ve resolved.

 

DOCTOR:   Pancho Akhtar  Interpretating Date/Time  09/18/2017 14:11:17

## 2017-09-18 NOTE — PD
HPI


Chief Complaint:  Psychiatric Symptoms


Time Seen by Provider:  10:30


Travel History


International Travel<30 days:  No


Contact w/Intl Traveler<30days:  No


Traveled to known affect area:  No





History of Present Illness


HPI


61-year-old male came to the emergency room since he's been hearing voices.  

Patient says that the voices are telling him to do all kinds of things.  

Sometimes they are telling him to hurt others.  No history of suicidal 

thoughts.  Vital signs are stable.  Patient denies any alcohol or drugs.  He 

came in voluntarily.  He has history of schizophrenia and has been taking his 

medications like is supposed to.





PFS


Past Medical History


*** Narrative Medical


List of his past medical, surgical, social and family history as reviewed from 

the nursing note.


Hx Anticoagulant Therapy:  No


Arthritis:  Yes


Autoimmune Disease:  No


Blood Disorders:  No


Anxiety:  Yes


Depression:  Yes


Cardiovascular Problems:  No


Chemotherapy:  No


Chest Pain:  No


Congestive Heart Failure:  No


COPD:  Yes


Cerebrovascular Accident:  No


Diabetes:  No


Diminished Hearing:  No


Endocrine:  No


Gastrointestinal Disorders:  No


GERD:  No


Genitourinary:  No


Hiatal Hernia:  No


Immune Disorder:  No


Implanted Vascular Access Dvce:  No


Kidney Stones:  No


Musculoskeletal:  Yes


Neurologic:  No


Psychiatric:  Yes (Schizophrenia)


Reproductive:  No


Respiratory:  No


Immunizations Current:  Yes


Migraines:  No


Radiation Therapy:  No


Renal Failure:  No


Schizophrenia:  Yes


Sickle Cell Disease:  No


Thyroid Disease:  No


Ulcer:  No


Tetanus Vaccination:  < 5 Years


Influenza Vaccination:  No





Past Surgical History


Abdominal Surgery:  Yes (GALL BLADDER REMOVED)


AICD:  No


Arteriovenous Shunt:  No


Cardiac Surgery:  No


Cholecystectomy:  Yes


Ear Surgery:  No


Endocrine Surgery:  No


Eye Surgery:  No


Genitourinary Surgery:  No


Gynecologic Surgery:  No


Insulin Pump:  No


Joint Replacement:  No


Oral Surgery:  No


Pacemaker:  No


Thoracic Surgery:  No


Other Surgery:  Yes





Social History


Alcohol Use:  No


Tobacco Use:  No


Substance Use:  No





Allergies-Medications


(Allergen,Severity, Reaction):  


Coded Allergies:  


     No Known Allergies (Verified , 9/18/17)


 Per MAR faxed by [employer] [phone].


Comments


No known drug allergies.


Reported Meds & Prescriptions





Reported Meds & Active Scripts


Active


Trazodone (Trazodone HCl) 50 Mg Tab 100 Mg PO HS


Depakote ER (Divalproex Sodium) 500 Mg Michael 1,000 Mg PO HS


Trihexyphenidyl (Trihexyphenidyl HCl) 2 Mg Tab 2 Mg PO BID


Gabapentin 100 Mg Cap 100 Mg PO TID


Reported


Haloperidol 5 Mg Tab 5 Mg PO BID


Meloxicam 7.5 Mg Tab 7.5 Mg PO DAILY


Remeron (Mirtazapine) 15 Mg Tab 15 Mg PO HS


Trihexyphenidyl (Trihexyphenidyl HCl) 2 Mg Tab 2 Mg PO BID





Narrative Medication


List of his home medications reviewed from the nursing note.





Review of Systems


Except as stated in HPI:  all other systems reviewed are Neg





Physical Exam


Narrative


GENERAL: Awake, alert, no obvious distress


SKIN: Focused skin assessment warm/dry.


HEAD: Atraumatic. Normocephalic. 


EYES: Pupils equal and round. No scleral icterus. No injection or drainage. 


ENT: No nasal bleeding or discharge.  Mucous membranes pink and moist.


NECK: Trachea midline. No JVD. 


CARDIOVASCULAR: Regular rate and rhythm.  No murmur appreciated.


RESPIRATORY: No accessory muscle use. Clear to auscultation. Breath sounds 

equal bilaterally. 


GASTROINTESTINAL: Abdomen soft, non-tender, nondistended. Hepatic and splenic 

margins not palpable. 


MUSCULOSKELETAL: No obvious deformities. No clubbing.  No cyanosis.  No edema. 


NEUROLOGICAL: Awake and alert. No obvious cranial nerve deficits.  Motor 

grossly within normal limits. Normal speech.


PSYCHIATRIC: Appropriate mood and affect; insight and judgment normal.





Data


Data


Last Documented VS





Vital Signs








  Date Time  Temp Pulse Resp B/P (MAP) Pulse Ox O2 Delivery O2 Flow Rate FiO2


 


9/19/17 12:11        


 


9/19/17 06:18  51 18     


 


9/19/17 02:00 98.3    95 Room Air  








Orders





 Orders


Complete Blood Count With Diff (9/18/17 10:33)


Comprehensive Metabolic Panel (9/18/17 10:33)


Electrocardiogram (9/18/17 10:33)


Valproic Acid (Depakene) (9/18/17 10:33)


Psych Screen (9/18/17 10:33)


Drug Screen, Random Urine (9/18/17 10:33)


Alcohol (Ethanol) (9/18/17 10:33)


Diet Regular Basic (9/18/17 Dinner)


Divalproex Er (Depakote Er) (9/18/17 21:00)


Gabapentin (Neurontin) (9/19/17 09:00)


Haloperidol (Haldol) (9/18/17 21:00)


Mirtazapine (Remeron) (9/18/17 21:00)


Trazodone (Desyrel) (9/18/17 21:00)


Trihexyphenidyl (Artane) (9/18/17 21:00)


Diet Regular Basic (9/19/17 Breakfast)





Labs





Laboratory Tests








Test


  9/18/17


10:55 9/18/17


11:25


 


White Blood Count 5.9 TH/MM3  


 


Red Blood Count 4.34 MIL/MM3  


 


Hemoglobin 13.3 GM/DL  


 


Hematocrit 39.1 %  


 


Mean Corpuscular Volume 90.2 FL  


 


Mean Corpuscular Hemoglobin 30.8 PG  


 


Mean Corpuscular Hemoglobin


Concent 34.1 % 


  


 


 


Red Cell Distribution Width 14.4 %  


 


Platelet Count 195 TH/MM3  


 


Mean Platelet Volume 7.6 FL  


 


Neutrophils (%) (Auto) 53.5 %  


 


Lymphocytes (%) (Auto) 34.8 %  


 


Monocytes (%) (Auto) 8.8 %  


 


Eosinophils (%) (Auto) 2.3 %  


 


Basophils (%) (Auto) 0.6 %  


 


Neutrophils # (Auto) 3.2 TH/MM3  


 


Lymphocytes # (Auto) 2.1 TH/MM3  


 


Monocytes # (Auto) 0.5 TH/MM3  


 


Eosinophils # (Auto) 0.1 TH/MM3  


 


Basophils # (Auto) 0.0 TH/MM3  


 


CBC Comment DIFF FINAL  


 


Differential Comment   


 


Blood Urea Nitrogen 12 MG/DL  


 


Creatinine 0.76 MG/DL  


 


Random Glucose 76 MG/DL  


 


Total Protein 7.4 GM/DL  


 


Albumin 3.6 GM/DL  


 


Calcium Level 8.6 MG/DL  


 


Alkaline Phosphatase 48 U/L  


 


Aspartate Amino Transf


(AST/SGOT) 14 U/L 


  


 


 


Alanine Aminotransferase


(ALT/SGPT) 27 U/L 


  


 


 


Total Bilirubin 0.4 MG/DL  


 


Sodium Level 137 MEQ/L  


 


Potassium Level 3.8 MEQ/L  


 


Chloride Level 103 MEQ/L  


 


Carbon Dioxide Level 32.1 MEQ/L  


 


Anion Gap 2 MEQ/L  


 


Estimat Glomerular Filtration


Rate 104 ML/MIN 


  


 


 


Valproic Acid (Depakene) Level 50 MCG/ML  


 


Ethyl Alcohol Level


  LESS THAN 3


MG/DL 


 


 


Urine Opiates Screen  NEG 


 


Urine Barbiturates Screen  NEG 


 


Urine Amphetamines Screen  NEG 


 


Urine Benzodiazepines Screen  NEG 


 


Urine Cocaine Screen  NEG 


 


Urine Cannabinoids Screen  NEG 











MDM


Medical Decision Making


Medical Screen Exam Complete:  Yes


Emergency Medical Condition:  Yes


Medical Record Reviewed:  Yes


Interpretation(s)


Twelve-lead EKG was reviewed by me.  Normal sinus rhythm, normal axis, 

nonspecific ST-T wave changes.  Heart rate of 64 bpm


Differential Diagnosis


Schizophrenia, auditory hallucination


Narrative Course


12:20 PM patient is medically cleared.  He needs to be seen by psych for 

screening.





Procedures


EKG Prior to Arrival:  Florin Quiroz MD Sep 18, 2017 10:31

## 2017-09-19 VITALS
DIASTOLIC BLOOD PRESSURE: 81 MMHG | SYSTOLIC BLOOD PRESSURE: 138 MMHG | RESPIRATION RATE: 18 BRPM | TEMPERATURE: 98.3 F | HEART RATE: 50 BPM | OXYGEN SATURATION: 95 %

## 2017-09-19 VITALS — SYSTOLIC BLOOD PRESSURE: 148 MMHG | HEART RATE: 51 BPM | RESPIRATION RATE: 18 BRPM | DIASTOLIC BLOOD PRESSURE: 80 MMHG

## 2017-09-19 NOTE — PD
Physical Exam


Time Seen by Provider:  10:50


AZUCENA Laird has evaluated the patient and patient will be discharged back to 

his living facility.





Data


Data


Last Documented VS





Vital Signs








  Date Time  Temp Pulse Resp B/P (MAP) Pulse Ox O2 Delivery O2 Flow Rate FiO2


 


9/19/17 06:18  51 18 148/80 (102)    


 


9/19/17 02:00 98.3    95 Room Air  








Orders





 Orders


Complete Blood Count With Diff (9/18/17 10:33)


Comprehensive Metabolic Panel (9/18/17 10:33)


Electrocardiogram (9/18/17 10:33)


Valproic Acid (Depakene) (9/18/17 10:33)


Psych Screen (9/18/17 10:33)


Drug Screen, Random Urine (9/18/17 10:33)


Alcohol (Ethanol) (9/18/17 10:33)


Diet Regular Basic (9/18/17 Dinner)


Divalproex Er (Depakote Er) (9/18/17 21:00)


Gabapentin (Neurontin) (9/19/17 09:00)


Haloperidol (Haldol) (9/18/17 21:00)


Mirtazapine (Remeron) (9/18/17 21:00)


Trazodone (Desyrel) (9/18/17 21:00)


Trihexyphenidyl (Artane) (9/18/17 21:00)


Diet Regular Basic (9/19/17 Breakfast)





Labs





Laboratory Tests








Test


  9/18/17


10:55 9/18/17


11:25


 


White Blood Count 5.9 TH/MM3  


 


Red Blood Count 4.34 MIL/MM3  


 


Hemoglobin 13.3 GM/DL  


 


Hematocrit 39.1 %  


 


Mean Corpuscular Volume 90.2 FL  


 


Mean Corpuscular Hemoglobin 30.8 PG  


 


Mean Corpuscular Hemoglobin


Concent 34.1 % 


  


 


 


Red Cell Distribution Width 14.4 %  


 


Platelet Count 195 TH/MM3  


 


Mean Platelet Volume 7.6 FL  


 


Neutrophils (%) (Auto) 53.5 %  


 


Lymphocytes (%) (Auto) 34.8 %  


 


Monocytes (%) (Auto) 8.8 %  


 


Eosinophils (%) (Auto) 2.3 %  


 


Basophils (%) (Auto) 0.6 %  


 


Neutrophils # (Auto) 3.2 TH/MM3  


 


Lymphocytes # (Auto) 2.1 TH/MM3  


 


Monocytes # (Auto) 0.5 TH/MM3  


 


Eosinophils # (Auto) 0.1 TH/MM3  


 


Basophils # (Auto) 0.0 TH/MM3  


 


CBC Comment DIFF FINAL  


 


Differential Comment   


 


Blood Urea Nitrogen 12 MG/DL  


 


Creatinine 0.76 MG/DL  


 


Random Glucose 76 MG/DL  


 


Total Protein 7.4 GM/DL  


 


Albumin 3.6 GM/DL  


 


Calcium Level 8.6 MG/DL  


 


Alkaline Phosphatase 48 U/L  


 


Aspartate Amino Transf


(AST/SGOT) 14 U/L 


  


 


 


Alanine Aminotransferase


(ALT/SGPT) 27 U/L 


  


 


 


Total Bilirubin 0.4 MG/DL  


 


Sodium Level 137 MEQ/L  


 


Potassium Level 3.8 MEQ/L  


 


Chloride Level 103 MEQ/L  


 


Carbon Dioxide Level 32.1 MEQ/L  


 


Anion Gap 2 MEQ/L  


 


Estimat Glomerular Filtration


Rate 104 ML/MIN 


  


 


 


Valproic Acid (Depakene) Level 50 MCG/ML  


 


Ethyl Alcohol Level


  LESS THAN 3


MG/DL 


 


 


Urine Opiates Screen  NEG 


 


Urine Barbiturates Screen  NEG 


 


Urine Amphetamines Screen  NEG 


 


Urine Benzodiazepines Screen  NEG 


 


Urine Cocaine Screen  NEG 


 


Urine Cannabinoids Screen  NEG 











MDM


Supervised Visit with YEISON:  No


Narrative Course


AZUCENA Webb has evaluated the patient and the patient will be discharged home.

  Patient contracts safety.  Denies suicidal or homicidal ideations.  Patient 

will be provided community resource packet to [facility] for follow-up.  Has 

friends and family for support.  Patient is medically cleared for discharge.


Diagnosis





 Primary Impression:  


 Adjustment disorder with disturbance of emotion


 Additional Impression:  


 Psychoses


Referrals:  


[facility] (Out patient)





Primary Care Physician





Psychiatrist





[facility]


Patient Instructions:  General Instructions, Mood Disorders (ED)





***Additional Instruction:  


Contract safety to your self and others


Follow-up with psychiatry


Follow-up with primary care provider


Follow-up with Aly Howard


Return to the emergency department immediately with worsening of symptoms


***Med/Other Pt SpecificInfo:  No Meds Exist/No RX given


Disposition:  01 DISCHARGE HOME


Condition:  Stable











Stephanie Field Sep 19, 2017 10:52

## 2017-09-19 NOTE — PD
__________________________________________________





History of Present Illness


Chief Complaint:  Psychiatric Symptoms


Time Seen by Provider:  10:45


Travel History


International Travel<30 Days:  No


Contact w/Intl Traveler<30days:  No


Known affected area:  No





Legal Status


Legal Status:  Voluntary


History of Present Illness:


History of Present Illness


HPI


61-year-old male with history of schizophrenia who  presents  to the emergency 

room since he's been hearing voices. It is alleged the  patient  has not been 

taking his  medications and that he ran out of his residence  when that tried 

to give him medications. The patient is well known to  [facility] psychiatry with 

multiple admissions to inpatient unit . 


EMR is  reviewed. He was last admitted to [facility] psychiatry on Sept. 8 th for 

increase in auditory hallucinations. 


The patient  was monitored in J pod over an extended period of time. He did not 

present any behavioral concerns. This morning he is calm, alert, oriented. 

Dressed in \[employer] [facility] [place] [facility]\"" and maintaining basic hygiene. Speech is clear and 

logical. He does nto appear to be internally preoccupied.  He denies that he is 

hearing any voices. No suicidal or homicidal ideation at this time. He is 

willing to go back to his residence at [facility]. We talked about the 

importance of him taking his medications  every day.





PFSH


Past Medical History


Hx Anticoagulant Therapy:  No


Arthritis:  Yes


Autoimmune Disease:  No


Blood Disorders:  No


Anxiety:  Yes


Depression:  Yes


Cardiovascular Problems:  No


Chemotherapy:  No


Chest Pain:  No


Congestive Heart Failure:  No


COPD:  Yes


Cerebrovascular Accident:  No


Diabetes:  No


Diminished Hearing:  No


Endocrine:  No


Gastrointestinal Disorders:  No


GERD:  No


Genitourinary:  No


Hiatal Hernia:  No


Immune Disorder:  No


Implanted Vascular Access Dvce:  No


Kidney Stones:  No


Musculoskeletal:  Yes


Neurologic:  No


Psychiatric:  Yes (Schizophrenia)


Reproductive:  No


Respiratory:  No


Immunizations Current:  Yes


Migraines:  No


Radiation Therapy:  No


Renal Failure:  No


Schizophrenia:  Yes


Sickle Cell Disease:  No


Thyroid Disease:  No


Ulcer:  No


Tetanus Vaccination:  < 5 Years


Influenza Vaccination:  No





Past Surgical History


Abdominal Surgery:  Yes (GALL BLADDER REMOVED)


AICD:  No


Arteriovenous Shunt:  No


Cardiac Surgery:  No


Cholecystectomy:  Yes


Ear Surgery:  No


Endocrine Surgery:  No


Eye Surgery:  No


Genitourinary Surgery:  No


Gynecologic Surgery:  No


Insulin Pump:  No


Joint Replacement:  No


Oral Surgery:  No


Pacemaker:  No


Thoracic Surgery:  No


Other Surgery:  Yes





Psychiatric History


Psychiatric History


Hx Psychiatric Treatment:  


SCHIZOPHRENIA


History of Inpatient Treatment:  Yes


Guns or firearms in home:  No





Social History


Single male. Lives in an halfway. On disability


Hx Alcohol Use:  No


Hx Tobacco Use:  No


Hx Substance Use:  No


Substance Use Type:  Marijuana, Nicotine/Cigarettes


Other Substances Used:  in the past-denies now


Hx of Substance Use Treatment:  No





Family Psychiatric History


None reported





Allergies-Medications


(Allergen,Severity, Reaction):  


Coded Allergies:  


     No Known Allergies (Verified , 9/18/17)


 Per MAR faxed by [employer] [phone].


Reported Meds & Prescriptions





Reported Meds & Active Scripts


Active


Trazodone (Trazodone HCl) 50 Mg Tab 100 Mg PO HS


Depakote ER (Divalproex Sodium) 500 Mg Michael 1,000 Mg PO HS


Trihexyphenidyl (Trihexyphenidyl HCl) 2 Mg Tab 2 Mg PO BID


Gabapentin 100 Mg Cap 100 Mg PO TID


Reported


Haloperidol 5 Mg Tab 5 Mg PO BID


Meloxicam 7.5 Mg Tab 7.5 Mg PO DAILY


Remeron (Mirtazapine) 15 Mg Tab 15 Mg PO HS


Trihexyphenidyl (Trihexyphenidyl HCl) 2 Mg Tab 2 Mg PO BID





Review of Systems


Except as stated in HPI:  all other systems reviewed are Neg





Exam


Alert:  Yes


[place]:  Person (ox4)


Mood:  Calm


Affect:  Appropriate


Speech:  Clear, Logical


Eye Contact:  Normal


Memory Intact:  Comment (No gross abnormality)


Hallucinations:  Other (Deneis at present although at baseline he admits to 

having  frequent halluciantions)


Delusions:  No


Suicidal:  Ideation (Deneis any)


Homicidal:  Ideation (Deneis any)


Insight/Judgement


Poor. Poor





MDM


Medical Decision Making


Medical Record Reviewed:  Yes


Assessment/Plan


61-year-old male with history of schizophrenia who  presents  to the emergency 

room since he's been hearing voices. It is alleged the  patient  has not been 

taking his  medications and that he ran out of his residence  when that tried 

to give him medications. The patient is well known to  [facility] psychiat[facility] and at 

baseline he has auditory hallucinations. No suicdal or homicdal ideation. 


At this time the patient is psychiatrically clear for discharge. I spoke with 

Carrillo ,  who will see patient to  assist with [facility] follow up.


He will return to [facility] for outpatient  follow up.





Orders





 Orders


Diet Regular Basic (9/18/17 Dinner)


Divalproex Er (Depakote Er) (9/18/17 21:00)


Gabapentin (Neurontin) (9/19/17 09:00)


Haloperidol (Haldol) (9/18/17 21:00)


Mirtazapine (Remeron) (9/18/17 21:00)


Trazodone (Desyrel) (9/18/17 21:00)


Trihexyphenidyl (Artane) (9/18/17 21:00)


Diet Regular Basic (9/19/17 Breakfast)





Results





Vital Signs








  Date Time  Temp Pulse Resp B/P (MAP) Pulse Ox O2 Delivery O2 Flow Rate FiO2


 


9/19/17 06:18  51 18 148/80 (102)    


 


9/19/17 02:00 98.3 50 18 138/81 (100) 95 Room Air  


 


9/18/17 22:15 98.9 50 18 141/79 (99) 99 Room Air  


 


9/18/17 15:15 98.0 80 18 128/80 (96) 96 Room Air  


 


9/18/17 12:30  73 16 131/92 (105) 98 Room Air  











Laboratory Tests








Test


  9/18/17


10:55 9/18/17


11:25


 


White Blood Count 5.9  


 


Red Blood Count 4.34  


 


Hemoglobin 13.3  


 


Hematocrit 39.1  


 


Mean Corpuscular Volume 90.2  


 


Mean Corpuscular Hemoglobin 30.8  


 


Mean Corpuscular Hemoglobin


Concent 34.1 


  


 


 


Red Cell Distribution Width 14.4  


 


Platelet Count 195  


 


Mean Platelet Volume 7.6  


 


Neutrophils (%) (Auto) 53.5  


 


Lymphocytes (%) (Auto) 34.8  


 


Monocytes (%) (Auto) 8.8  


 


Eosinophils (%) (Auto) 2.3  


 


Basophils (%) (Auto) 0.6  


 


Neutrophils # (Auto) 3.2  


 


Lymphocytes # (Auto) 2.1  


 


Monocytes # (Auto) 0.5  


 


Eosinophils # (Auto) 0.1  


 


Basophils # (Auto) 0.0  


 


CBC Comment DIFF FINAL  


 


Differential Comment   


 


Blood Urea Nitrogen 12  


 


Creatinine 0.76  


 


Random Glucose 76  


 


Total Protein 7.4  


 


Albumin 3.6  


 


Calcium Level 8.6  


 


Alkaline Phosphatase 48  


 


Aspartate Amino Transf


(AST/SGOT) 14 


  


 


 


Alanine Aminotransferase


(ALT/SGPT) 27 


  


 


 


Total Bilirubin 0.4  


 


Sodium Level 137  


 


Potassium Level 3.8  


 


Chloride Level 103  


 


Carbon Dioxide Level 32.1  


 


Anion Gap 2  


 


Estimat Glomerular Filtration


Rate 104 


  


 


 


Valproic Acid (Depakene) Level 50  


 


Ethyl Alcohol Level LESS THAN 3  


 


Urine Opiates Screen  NEG 


 


Urine Barbiturates Screen  NEG 


 


Urine Amphetamines Screen  NEG 


 


Urine Benzodiazepines Screen  NEG 


 


Urine Cocaine Screen  NEG 


 


Urine Cannabinoids Screen  NEG 











Diagnosis





 Primary Impression:  


 Schizophrenia


Psychiatrically Cleared:  Yes


***Med/ Other Pt Specific Info:  No Change to Meds


Disposition:  03 DISCHARGE TO SNF


Condition:  Stable





Problem Qualifiers








 Primary Impression:  


 Schizophrenia


 Qualified Codes:  F20.0 - Paranoid schizophrenia








Tracey Sloan [facility] Sep 19, 2017 10:53

## 2017-11-06 ENCOUNTER — HOSPITAL ENCOUNTER (EMERGENCY)
Dept: HOSPITAL 17 - NEPD | Age: 62
Discharge: HOME | End: 2017-11-06
Payer: MEDICAID

## 2017-11-06 VITALS
SYSTOLIC BLOOD PRESSURE: 158 MMHG | TEMPERATURE: 98.2 F | DIASTOLIC BLOOD PRESSURE: 92 MMHG | OXYGEN SATURATION: 97 % | HEART RATE: 62 BPM | RESPIRATION RATE: 15 BRPM

## 2017-11-06 VITALS — HEIGHT: 68 IN | BODY MASS INDEX: 23.39 KG/M2 | WEIGHT: 154.32 LBS

## 2017-11-06 DIAGNOSIS — R09.1: ICD-10-CM

## 2017-11-06 DIAGNOSIS — R10.12: Primary | ICD-10-CM

## 2017-11-06 DIAGNOSIS — F20.0: ICD-10-CM

## 2017-11-06 LAB
ALP SERPL-CCNC: 49 U/L (ref 45–117)
ALT SERPL-CCNC: 33 U/L (ref 12–78)
ANION GAP SERPL CALC-SCNC: 6 MEQ/L (ref 5–15)
AST SERPL-CCNC: 22 U/L (ref 15–37)
BASOPHILS # BLD AUTO: 0 TH/MM3 (ref 0–0.2)
BASOPHILS NFR BLD: 0.8 % (ref 0–2)
BILIRUB SERPL-MCNC: 0.5 MG/DL (ref 0.2–1)
BUN SERPL-MCNC: 18 MG/DL (ref 7–18)
CHLORIDE SERPL-SCNC: 101 MEQ/L (ref 98–107)
EOSINOPHIL # BLD: 0.2 TH/MM3 (ref 0–0.4)
EOSINOPHIL NFR BLD: 3.8 % (ref 0–4)
ERYTHROCYTE [DISTWIDTH] IN BLOOD BY AUTOMATED COUNT: 14.6 % (ref 11.6–17.2)
GFR SERPLBLD BASED ON 1.73 SQ M-ARVRAT: 100 ML/MIN (ref 89–?)
HCO3 BLD-SCNC: 32.5 MEQ/L (ref 21–32)
HCT VFR BLD CALC: 39.7 % (ref 39–51)
HEMO FLAGS: (no result)
LYMPHOCYTES # BLD AUTO: 2.3 TH/MM3 (ref 1–4.8)
LYMPHOCYTES NFR BLD AUTO: 48.2 % (ref 9–44)
MCH RBC QN AUTO: 30.4 PG (ref 27–34)
MCHC RBC AUTO-ENTMCNC: 34 % (ref 32–36)
MCV RBC AUTO: 89.3 FL (ref 80–100)
MONOCYTES NFR BLD: 10.3 % (ref 0–8)
NEUTROPHILS # BLD AUTO: 1.8 TH/MM3 (ref 1.8–7.7)
NEUTROPHILS NFR BLD AUTO: 36.9 % (ref 16–70)
PLATELET # BLD: 164 TH/MM3 (ref 150–450)
POTASSIUM SERPL-SCNC: 4.5 MEQ/L (ref 3.5–5.1)
RBC # BLD AUTO: 4.44 MIL/MM3 (ref 4.5–5.9)
SODIUM SERPL-SCNC: 139 MEQ/L (ref 136–145)
WBC # BLD AUTO: 4.8 TH/MM3 (ref 4–11)

## 2017-11-06 PROCEDURE — 83690 ASSAY OF LIPASE: CPT

## 2017-11-06 PROCEDURE — 85025 COMPLETE CBC W/AUTO DIFF WBC: CPT

## 2017-11-06 PROCEDURE — 99284 EMERGENCY DEPT VISIT MOD MDM: CPT

## 2017-11-06 PROCEDURE — 80053 COMPREHEN METABOLIC PANEL: CPT

## 2017-11-06 PROCEDURE — 71020: CPT

## 2017-11-06 NOTE — PD
HPI


.


Abdominal pain


Chief Complaint:  GI Complaint


Time Seen by Provider:  12:16


Travel History


International Travel<30 days:  No


Contact w/Intl Traveler<30days:  No


Traveled to known affect area:  No





History of Present Illness


HPI


This patient presents complaining with abdominal pain.  Onset was 6 months ago.

  The pain is located on the left side.  He states that his "lungs hurt" also 

and that he had hemoptysis yesterday.  He further reports that he needs some 

pilo crackers because he has not had anything to eat today.  He rates his 

abdominal pain is 8/10.  No modifying factors.





PFSH


Past Medical History


Hx Anticoagulant Therapy:  No


Arthritis:  Yes


Autoimmune Disease:  No


Blood Disorders:  No


Anxiety:  Yes


Depression:  Yes


Cardiovascular Problems:  No


Chemotherapy:  No


Chest Pain:  No


Congestive Heart Failure:  No


COPD:  Yes


Cerebrovascular Accident:  No


Diabetes:  No


Diminished Hearing:  No


Endocrine:  No


Gastrointestinal Disorders:  No


GERD:  No


Genitourinary:  No


Hiatal Hernia:  No


Immune Disorder:  No


Implanted Vascular Access Dvce:  No


Kidney Stones:  No


Musculoskeletal:  Yes


Neurologic:  No


Psychiatric:  Yes (Schizophrenia)


Reproductive:  No


Respiratory:  No


Immunizations Current:  Yes


Migraines:  No


Radiation Therapy:  No


Renal Failure:  No


Schizophrenia:  Yes


Sickle Cell Disease:  No


Thyroid Disease:  No


Ulcer:  No





Past Surgical History


Abdominal Surgery:  Yes (GALL BLADDER REMOVED)


AICD:  No


Arteriovenous Shunt:  No


Cardiac Surgery:  No


Cholecystectomy:  Yes


Ear Surgery:  No


Endocrine Surgery:  No


Eye Surgery:  No


Genitourinary Surgery:  No


Gynecologic Surgery:  No


Insulin Pump:  No


Joint Replacement:  No


Oral Surgery:  No


Pacemaker:  No


Thoracic Surgery:  No


Other Surgery:  Yes





Social History


Alcohol Use:  No


Tobacco Use:  No


Substance Use:  No





Allergies-Medications


(Allergen,Severity, Reaction):  


Coded Allergies:  


     No Known Allergies (Verified  Adverse Reaction, Unknown, 11/6/17)


 Per MAR faxed by [employer] [phone].


Reported Meds & Prescriptions





Reported Meds & Active Scripts


Active


Depakote ER (Divalproex Sodium) 500 Mg Michael 1,000 Mg PO HS


Gabapentin 100 Mg Cap 100 Mg PO TID


Reported


Haloperidol 5 Mg Tab 5 Mg PO BID








Review of Systems


Except as stated in HPI:  all other systems reviewed are Neg





Physical Exam


Narrative


GENERAL: Awake and alert and in no acute distress.


SKIN:  warm/dry.  Good color and capillary refill.


HEAD: Normocephalic.  Atraumatic.


EYES: Pupils equal and round. No scleral icterus. No injection or drainage. 


ENT: No nasal bleeding or discharge.  Mucous membranes pink and moist.


NECK: Trachea midline.  Full range of motion without pain.. 


CARDIOVASCULAR: Regular rate and rhythm.  Heart sounds are normal.


RESPIRATORY: No accessory muscle use. Clear to auscultation. Breath sounds 

equal bilaterally. 


GASTROINTESTINAL: Abdomen soft.  Nontender.  Bowel sounds present.  

Nondistended.


MUSCULOSKELETAL: No obvious deformities. 


NEUROLOGICAL: Awake and alert. No obvious cranial nerve deficits.  Motor 

grossly within normal limits. Normal speech.


PSYCHIATRIC: Appropriate mood and affect; insight and judgment normal.





Data


Data


Last Documented VS





Vital Signs








  Date Time  Temp Pulse Resp B/P (MAP) Pulse Ox O2 Delivery O2 Flow Rate FiO2


 


11/6/17 09:36 98.2 62 15 158/92 (114) 97   








Orders





 Orders


Chest, Pa & Lat (11/6/17 )


Complete Blood Count With Diff (11/6/17 12:37)


Comprehensive Metabolic Panel (11/6/17 12:37)


Lipase (11/6/17 12:37)


Iv Access Insert/Monitor (11/6/17 12:37)


Sodium Chloride 0.9% Flush (Ns Flush) (11/6/17 12:45)





Labs





Laboratory Tests








Test


  11/6/17


12:50


 


White Blood Count 4.8 TH/MM3 


 


Red Blood Count 4.44 MIL/MM3 


 


Hemoglobin 13.5 GM/DL 


 


Hematocrit 39.7 % 


 


Mean Corpuscular Volume 89.3 FL 


 


Mean Corpuscular Hemoglobin 30.4 PG 


 


Mean Corpuscular Hemoglobin


Concent 34.0 % 


 


 


Red Cell Distribution Width 14.6 % 


 


Platelet Count 164 TH/MM3 


 


Mean Platelet Volume 7.7 FL 


 


Neutrophils (%) (Auto) 36.9 % 


 


Lymphocytes (%) (Auto) 48.2 % 


 


Monocytes (%) (Auto) 10.3 % 


 


Eosinophils (%) (Auto) 3.8 % 


 


Basophils (%) (Auto) 0.8 % 


 


Neutrophils # (Auto) 1.8 TH/MM3 


 


Lymphocytes # (Auto) 2.3 TH/MM3 


 


Monocytes # (Auto) 0.5 TH/MM3 


 


Eosinophils # (Auto) 0.2 TH/MM3 


 


Basophils # (Auto) 0.0 TH/MM3 


 


CBC Comment DIFF FINAL 


 


Differential Comment  


 


Blood Urea Nitrogen 18 MG/DL 


 


Creatinine 0.78 MG/DL 


 


Random Glucose 81 MG/DL 


 


Total Protein 7.5 GM/DL 


 


Albumin 3.4 GM/DL 


 


Calcium Level 8.3 MG/DL 


 


Alkaline Phosphatase 49 U/L 


 


Aspartate Amino Transf


(AST/SGOT) 22 U/L 


 


 


Alanine Aminotransferase


(ALT/SGPT) 33 U/L 


 


 


Total Bilirubin 0.5 MG/DL 


 


Sodium Level 139 MEQ/L 


 


Potassium Level 4.5 MEQ/L 


 


Chloride Level 101 MEQ/L 


 


Carbon Dioxide Level 32.5 MEQ/L 


 


Anion Gap 6 MEQ/L 


 


Estimat Glomerular Filtration


Rate 100 ML/MIN 


 


 


Lipase 120 U/L 











[facility]


Medical Decision Making


Medical Screen Exam Complete:  Yes


Emergency Medical Condition:  Yes


Differential Diagnosis


Differential diagnosis of abdominal pain includes but is not limited to 

gastritis, pancreatitis, hepatitis, gastroenteritis, gallbladder disease, 

constipation, urinary retention, UTI, peptic ulcer disease, diverticulitis or 

appendicitis


Narrative Course


This patient presents with a 6 month history of abdominal pain.  He is also 

complaining that his lungs hurt.





He has a benign abdominal exam.  His lungs are clear with full air movement 

throughout.








Last Impressions








Chest X-Ray 11/6/17 0000 Signed





Impressions: 





 Service Date/Time:  Monday, November 6, 2017 11:17 - CONCLUSION: No acute 





 disease.       Mani Gilmore MD 








CBC & BMP Diagram


11/6/17 12:50








Total Protein 7.5, Albumin 3.4, Calcium Level 8.3 L, Alkaline Phosphatase 49, 

Aspartate Amino Transf (AST/SGOT) 22, Alanine Aminotransferase (ALT/SGPT) 33, 

Total Bilirubin 0.5





The history, exam, diagnostic testing, and current condition do not suggest any 

significant pathology to warrant further testing, continued ED treatment, 

admission, or surgical evaluation at this point.  No EMC was found.  The patient

's condition is stable and appropriate for discharge.





Diagnosis





 Primary Impression:  


 Abdominal pain


 Qualified Codes:  R10.12 - Left upper quadrant pain


 Additional Impressions:  


 Pleurisy


 Schizophrenia, paranoid, chronic


Patient Instructions:  Abdominal Pain (ED), General Instructions


Disposition:  01 DISCHARGE HOME


Condition:  Stable











Barb Gillis MD Nov 6, 2017 14:17

## 2017-11-06 NOTE — RADRPT
EXAM DATE/TIME:  11/06/2017 11:17 

 

[facility] COMPARISON:     

No previous studies available for comparison.

 

                     

INDICATIONS :     

Shortness of breath.

                     

 

MEDICAL HISTORY :     

Chronic obstructive pulmonary disease.          

 

SURGICAL HISTORY :     

None.   

 

ENCOUNTER:     

Initial                                        

 

ACUITY:     

1 week      

 

PAIN SCORE:     

0/10

 

LOCATION:     

Bilateral chest 

 

FINDINGS:     

PA and lateral views of the chest demonstrate the lungs to be symmetrically aerated without evidence 
of mass, infiltrate or effusion.  The cardiomediastinal contours are unremarkable.  Osseous structure
s are intact.

 

CONCLUSION:     No acute disease.  

 

 

 

 Mani Gilmore MD on November 06, 2017 at 11:25           

Board Certified Radiologist.

 This report was verified electronically.

## 2018-02-21 ENCOUNTER — HOSPITAL ENCOUNTER (EMERGENCY)
Dept: HOSPITAL 17 - NEPC | Age: 63
Discharge: HOME | End: 2018-02-21
Payer: MEDICAID

## 2018-02-21 VITALS — SYSTOLIC BLOOD PRESSURE: 162 MMHG | RESPIRATION RATE: 18 BRPM | DIASTOLIC BLOOD PRESSURE: 92 MMHG | HEART RATE: 52 BPM

## 2018-02-21 VITALS
OXYGEN SATURATION: 96 % | DIASTOLIC BLOOD PRESSURE: 77 MMHG | TEMPERATURE: 98.1 F | HEART RATE: 62 BPM | RESPIRATION RATE: 18 BRPM | SYSTOLIC BLOOD PRESSURE: 122 MMHG

## 2018-02-21 VITALS
SYSTOLIC BLOOD PRESSURE: 184 MMHG | RESPIRATION RATE: 20 BRPM | DIASTOLIC BLOOD PRESSURE: 93 MMHG | TEMPERATURE: 97.6 F | OXYGEN SATURATION: 100 % | HEART RATE: 56 BPM

## 2018-02-21 VITALS
HEART RATE: 65 BPM | OXYGEN SATURATION: 98 % | SYSTOLIC BLOOD PRESSURE: 121 MMHG | DIASTOLIC BLOOD PRESSURE: 78 MMHG | RESPIRATION RATE: 18 BRPM

## 2018-02-21 VITALS — OXYGEN SATURATION: 100 %

## 2018-02-21 VITALS
OXYGEN SATURATION: 100 % | SYSTOLIC BLOOD PRESSURE: 177 MMHG | RESPIRATION RATE: 14 BRPM | DIASTOLIC BLOOD PRESSURE: 102 MMHG | TEMPERATURE: 95.4 F | HEART RATE: 50 BPM

## 2018-02-21 VITALS — HEART RATE: 52 BPM

## 2018-02-21 VITALS
DIASTOLIC BLOOD PRESSURE: 78 MMHG | RESPIRATION RATE: 15 BRPM | HEART RATE: 49 BPM | OXYGEN SATURATION: 100 % | TEMPERATURE: 96.1 F | SYSTOLIC BLOOD PRESSURE: 147 MMHG

## 2018-02-21 VITALS — BODY MASS INDEX: 23.36 KG/M2 | HEIGHT: 70 IN | WEIGHT: 163.14 LBS

## 2018-02-21 DIAGNOSIS — R00.1: ICD-10-CM

## 2018-02-21 DIAGNOSIS — R07.9: Primary | ICD-10-CM

## 2018-02-21 DIAGNOSIS — F20.9: ICD-10-CM

## 2018-02-21 DIAGNOSIS — F17.210: ICD-10-CM

## 2018-02-21 DIAGNOSIS — F32.9: ICD-10-CM

## 2018-02-21 DIAGNOSIS — M19.90: ICD-10-CM

## 2018-02-21 DIAGNOSIS — J44.9: ICD-10-CM

## 2018-02-21 LAB
ALBUMIN SERPL-MCNC: 3.3 GM/DL (ref 3.4–5)
ALP SERPL-CCNC: 50 U/L (ref 45–117)
ALT SERPL-CCNC: 50 U/L (ref 12–78)
AST SERPL-CCNC: 33 U/L (ref 15–37)
BASOPHILS # BLD AUTO: 0.1 TH/MM3 (ref 0–0.2)
BASOPHILS NFR BLD: 1.4 % (ref 0–2)
BILIRUB SERPL-MCNC: 0.6 MG/DL (ref 0.2–1)
BUN SERPL-MCNC: 19 MG/DL (ref 7–18)
CALCIUM SERPL-MCNC: 8.1 MG/DL (ref 8.5–10.1)
CHLORIDE SERPL-SCNC: 103 MEQ/L (ref 98–107)
CREAT SERPL-MCNC: 0.96 MG/DL (ref 0.6–1.3)
EOSINOPHIL # BLD: 0.2 TH/MM3 (ref 0–0.4)
EOSINOPHIL NFR BLD: 3.6 % (ref 0–4)
ERYTHROCYTE [DISTWIDTH] IN BLOOD BY AUTOMATED COUNT: 15.8 % (ref 11.6–17.2)
GFR SERPLBLD BASED ON 1.73 SQ M-ARVRAT: 79 ML/MIN (ref 89–?)
GLUCOSE SERPL-MCNC: 94 MG/DL (ref 74–106)
HCO3 BLD-SCNC: 34 MEQ/L (ref 21–32)
HCT VFR BLD CALC: 37.2 % (ref 39–51)
HGB BLD-MCNC: 13.5 GM/DL (ref 13–17)
INR PPP: 1.2 RATIO
LYMPHOCYTES # BLD AUTO: 2.9 TH/MM3 (ref 1–4.8)
LYMPHOCYTES NFR BLD AUTO: 56.1 % (ref 9–44)
MAGNESIUM SERPL-MCNC: 2.2 MG/DL (ref 1.5–2.5)
MCH RBC QN AUTO: 32 PG (ref 27–34)
MCHC RBC AUTO-ENTMCNC: 36.2 % (ref 32–36)
MCV RBC AUTO: 88.2 FL (ref 80–100)
MONOCYTE #: 0.4 TH/MM3 (ref 0–0.9)
MONOCYTES NFR BLD: 8.2 % (ref 0–8)
NEUTROPHILS # BLD AUTO: 1.6 TH/MM3 (ref 1.8–7.7)
NEUTROPHILS NFR BLD AUTO: 30.7 % (ref 16–70)
PLATELET # BLD: 138 TH/MM3 (ref 150–450)
PMV BLD AUTO: 7.7 FL (ref 7–11)
PROT SERPL-MCNC: 7.4 GM/DL (ref 6.4–8.2)
PROTHROMBIN TIME: 12 SEC (ref 9.8–11.6)
RBC # BLD AUTO: 4.22 MIL/MM3 (ref 4.5–5.9)
SODIUM SERPL-SCNC: 142 MEQ/L (ref 136–145)
TROPONIN I SERPL-MCNC: (no result) NG/ML (ref 0.02–0.05)
WBC # BLD AUTO: 5.2 TH/MM3 (ref 4–11)

## 2018-02-21 PROCEDURE — 78452 HT MUSCLE IMAGE SPECT MULT: CPT

## 2018-02-21 PROCEDURE — 82550 ASSAY OF CK (CPK): CPT

## 2018-02-21 PROCEDURE — 85730 THROMBOPLASTIN TIME PARTIAL: CPT

## 2018-02-21 PROCEDURE — 83690 ASSAY OF LIPASE: CPT

## 2018-02-21 PROCEDURE — 80053 COMPREHEN METABOLIC PANEL: CPT

## 2018-02-21 PROCEDURE — 83735 ASSAY OF MAGNESIUM: CPT

## 2018-02-21 PROCEDURE — 93017 CV STRESS TEST TRACING ONLY: CPT

## 2018-02-21 PROCEDURE — 93005 ELECTROCARDIOGRAM TRACING: CPT

## 2018-02-21 PROCEDURE — A9502 TC99M TETROFOSMIN: HCPCS

## 2018-02-21 PROCEDURE — 94664 DEMO&/EVAL PT USE INHALER: CPT

## 2018-02-21 PROCEDURE — 99284 EMERGENCY DEPT VISIT MOD MDM: CPT

## 2018-02-21 PROCEDURE — 85610 PROTHROMBIN TIME: CPT

## 2018-02-21 PROCEDURE — 71045 X-RAY EXAM CHEST 1 VIEW: CPT

## 2018-02-21 PROCEDURE — 85025 COMPLETE CBC W/AUTO DIFF WBC: CPT

## 2018-02-21 PROCEDURE — 84484 ASSAY OF TROPONIN QUANT: CPT

## 2018-02-21 NOTE — TR
Date Performed: 02/21/2018       Time Performed: 11:20:13

 

DOCTOR:      Tima Neumann 

 

DRUG LIST:     

CLINICAL HISTORY:      CHEST  PAIN

REASON FOR TEST:      CHEST  PAIN

REASON FOR ENDING:     

OBSERVATION:     

CONCLUSION:     

COMMENTS:      Lexiscan stress test was performed under standard four minute protocol.  Radionuclide 
was injected one minute prior to ending the test. No electrocardiographic abormalities were present t
o suggest ischemia. Nuclear imaging and interpretation are pending.

## 2018-02-21 NOTE — EKG
Date Performed: 02/21/2018       Time Performed: 01:02:27

 

PTAGE:      62 years

 

EKG:      Sinus rhythm 

 

 NONSPECIFIC T-WAVE ABNORMALITY BORDERLINE ECG 

 

NO PREVIOUS TRACING            

 

DOCTOR:   Tima Neumann  Interpretating Date/Time  02/21/2018 15:52:54

## 2018-02-21 NOTE — PD
HPI


Chief Complaint:  Chest Pain


Time Seen by Provider:  00:58


Travel History


International Travel<30 days:  No


Contact w/Intl Traveler<30days:  No


Traveled to known affect area:  No





History of Present Illness


HPI


The patient is a 62 year old male who presents to the [facility] emergency 

department with a history of abdominal pain that radiated up into his chest at 

5PM yesterday. It has been constant. It was a dull pain. It was an 8/10 in 

severity. He had associated SOB. He has a h/o COPD and smokes 1 cig per day.  

He reports having a chronic smoker's cough that is no worse than usual.  He 

denies radiation of the pain.  He reports that it is in the center of his 

chest.  The patient denies having any nausea or vomiting associated with this.  

He denies having any diarrhea.  He denies having any diaphoresis.  The patient 

was given 324 mg of aspirin by ambulance services along with sublingual 

nitroglycerin.  Patient was noted to have T-wave changes according to them.  

The patient was noted to have improvement of his pain with nitroglycerin 

administration.  He denies any prior history of coronary artery disease, 

congestive heart failure, DVT, or PE.  Otherwise on review of systems, the 

patient denies having any known recent fevers, neck pain, urinary symptoms, or 

neurologic symptoms. He has had cough and congestion recurrent over the last 6 

months.  He denies having any indigestion or acid reflux symptoms.





PFS


Past Medical History


*** Narrative Medical


The patient's past medical history is significant for COPD, schizophrenia, 

anxiety and depression.


Hx Anticoagulant Therapy:  No


Arthritis:  Yes


Autoimmune Disease:  No


Blood Disorders:  No


Anxiety:  Yes


Depression:  Yes


Cardiovascular Problems:  No


Chemotherapy:  No


Chest Pain:  No


Congestive Heart Failure:  No


COPD:  Yes


Cerebrovascular Accident:  No


Diabetes:  No


Diminished Hearing:  No


Endocrine:  No


Gastrointestinal Disorders:  No


GERD:  No


Genitourinary:  No


Hiatal Hernia:  No


Immune Disorder:  No


Implanted Vascular Access Dvce:  No


Kidney Stones:  No


Musculoskeletal:  Yes


Neurologic:  No


Psychiatric:  Yes (Schizophrenia)


Reproductive:  No


Respiratory:  No


Immunizations Current:  Yes


Migraines:  No


Radiation Therapy:  No


Renal Failure:  No


Schizophrenia:  Yes


Sickle Cell Disease:  No


Thyroid Disease:  No


Ulcer:  No


Influenza Vaccination:  No





Past Surgical History


*** Narrative Surgical


The patient's past surgical history is significant for a cholecystectomy.


Abdominal Surgery:  Yes (GALL BLADDER REMOVED)


AICD:  No


Arteriovenous Shunt:  No


Cardiac Surgery:  No


Cholecystectomy:  Yes


Ear Surgery:  No


Endocrine Surgery:  No


Eye Surgery:  No


Genitourinary Surgery:  No


Gynecologic Surgery:  No


Insulin Pump:  No


Joint Replacement:  No


Oral Surgery:  No


Pacemaker:  No


Thoracic Surgery:  No


Other Surgery:  Yes





Social History


Alcohol Use:  No


Tobacco Use:  Yes (1 cig per day)


Substance Use:  No





Allergies-Medications


(Allergen,Severity, Reaction):  


Coded Allergies:  


     No Known Allergies (Verified  Allergy, Unknown, 2/21/18)


 Per MAR faxed by [employer] [phone].


Reported Meds & Prescriptions





Reported Meds & Active Scripts


Active


Depakote ER (Divalproex Sodium) 500 Mg Michael 1,000 Mg PO HS


Gabapentin 100 Mg Cap 100 Mg PO TID


Reported


Trihexyphenidyl (Trihexyphenidyl HCl) 2 Mg Tab 2 Mg PO BID


Mirtazapine 15 Mg Tab 15 Mg PO HS


Meloxicam 7.5 Mg Tab 7.5 Mg PO DAILY


Haloperidol 5 Mg Tab 5 Mg PO BID








Review of Systems


General / Constitutional:  No: Fever


Eyes:  No: Visual changes


HENT:  No: Headaches


Cardiovascular:  Positive: Chest Pain or Discomfort, Dyspnea on exertion


Respiratory:  Positive: Cough, Shortness of Breath


Gastrointestinal:  Positive: Abdominal Pain


Genitourinary:  No: Dysuria


Musculoskeletal:  No: Pain


Skin:  No Rash


Neurologic:  No: Weakness


Psychiatric:  No: Depression


Endocrine:  No: Polydipsia


Hematologic/Lymphatic:  No: Easy Bruising





Physical Exam


Narrative


General: 


The patient is a well-developed well-nourished male in no acute distress. 





Head and Neck exam: 


Head is normocephalic atraumatic. 


Eyes: EOMI, pupils are equal round and reactive to light. 


Nose: Midline septum with pink mucous membranes 


Mouth: Dentition unremarkable. Moist mucus membranes. Posterior oropharynx is 

not erythematous. No tonsillar hypertrophy. Uvula midline. Airway patent. 


Neck: No palpable lymphadenopathy. No nuchal rigidity. No thyromegaly. 





Cardiovascular: 


Sinus bradycardia in the 50 without murmurs, gallops, or rubs.





Lungs: 


Clear to auscultation bilaterally. No wheezes, rhonchi, or rales.


 


Abdomen:


Soft, without tenderness to palpation in all 4 quadrants of the abdomen. No 

guarding, rebound, or rigidity.  Normal bowel sounds are audible.  No 

tenderness on palpation of McBurney's point.





Extremities: 


No clubbing, cyanosis, or edema. 2+ pulses in all 4 extremities.  No calf 

tenderness on palpation.





Back: 


No spinous process tenderness to palpation. No costovertebral angle tenderness 

to palpation. 





Neurologic Exam: Grossly nonfocal.





Skin Exam: No rash noted. Intact skin that is warm and dry.





Data


Data


Last Documented VS





Vital Signs








  Date Time  Temp Pulse Resp B/P (MAP) Pulse Ox O2 Delivery O2 Flow Rate FiO2


 


2/21/18 01:10     100 Nasal Cannula 2.00 


 


2/21/18 01:04  65 18 121/78 (92)    


 


2/21/18 00:58 98.1       








Orders





 Orders


Electrocardiogram (2/21/18 01:00)


Ckmb (Isoenzyme) Profile (2/21/18 01:00)


Complete Blood Count With Diff (2/21/18 01:00)


Comprehensive Metabolic Panel (2/21/18 01:00)


Magnesium (Mg) (2/21/18 01:00)


Prothrombin Time / Inr (Pt) (2/21/18 01:00)


Act Partial Throm Time (Ptt) (2/21/18 01:00)


Troponin I (2/21/18 01:00)


Lipase (2/21/18 01:00)


Chest, Single Ap (2/21/18 01:00)


Ecg Monitoring (2/21/18 01:00)


Bilateral Bp Monitoring (2/21/18 01:00)


Iv Access Insert/Monitor (2/21/18 01:00)


Oximetry (2/21/18 01:00)


Oxygen Administration (2/21/18 01:00)


Nitroglycerin 2% Oint (Nitroglycerin 2% (2/21/18 01:00)


Sodium Chloride 0.9% Flush (Ns Flush) (2/21/18 01:00)


Nitroglycerin Sl (Nitrostat Sl) (2/21/18 01:00)


Admit Order (Ed Use Only) (2/21/18 03:11)





Labs





Laboratory Tests








Test


  2/21/18


01:10


 


White Blood Count 5.2 TH/MM3 


 


Red Blood Count 4.22 MIL/MM3 


 


Hemoglobin 13.5 GM/DL 


 


Hematocrit 37.2 % 


 


Mean Corpuscular Volume 88.2 FL 


 


Mean Corpuscular Hemoglobin 32.0 PG 


 


Mean Corpuscular Hemoglobin


Concent 36.2 % 


 


 


Red Cell Distribution Width 15.8 % 


 


Platelet Count 138 TH/MM3 


 


Mean Platelet Volume 7.7 FL 


 


Neutrophils (%) (Auto) 30.7 % 


 


Lymphocytes (%) (Auto) 56.1 % 


 


Monocytes (%) (Auto) 8.2 % 


 


Eosinophils (%) (Auto) 3.6 % 


 


Basophils (%) (Auto) 1.4 % 


 


Neutrophils # (Auto) 1.6 TH/MM3 


 


Lymphocytes # (Auto) 2.9 TH/MM3 


 


Monocytes # (Auto) 0.4 TH/MM3 


 


Eosinophils # (Auto) 0.2 TH/MM3 


 


Basophils # (Auto) 0.1 TH/MM3 


 


CBC Comment AUTO DIFF 


 


Differential Comment


  AUTO DIFF


CONFIRMED


 


Platelet Estimate LOW 


 


Platelet Morphology Comment NORMAL 


 


Red Cell Morphology Comment NORMAL 


 


Prothrombin Time 12.0 SEC 


 


Prothromb Time International


Ratio 1.2 RATIO 


 


 


Activated Partial


Thromboplast Time 25.3 SEC 


 


 


Blood Urea Nitrogen 19 MG/DL 


 


Creatinine 0.96 MG/DL 


 


Random Glucose 94 MG/DL 


 


Total Protein 7.4 GM/DL 


 


Albumin 3.3 GM/DL 


 


Calcium Level 8.1 MG/DL 


 


Magnesium Level 2.2 MG/DL 


 


Alkaline Phosphatase 50 U/L 


 


Aspartate Amino Transf


(AST/SGOT) 33 U/L 


 


 


Alanine Aminotransferase


(ALT/SGPT) 50 U/L 


 


 


Total Bilirubin 0.6 MG/DL 


 


Sodium Level 142 MEQ/L 


 


Potassium Level 3.7 MEQ/L 


 


Chloride Level 103 MEQ/L 


 


Carbon Dioxide Level 34.0 MEQ/L 


 


Anion Gap 5 MEQ/L 


 


Estimat Glomerular Filtration


Rate 79 ML/MIN 


 


 


Total Creatine Kinase 90 U/L 


 


Troponin I


  LESS THAN 0.02


NG/ML


 


Lipase 156 U/L 











MDM


Medical Decision Making


Medical Screen Exam Complete:  Yes


Emergency Medical Condition:  Yes


Medical Record Reviewed:  Yes


Interpretation(s)





Last Impressions








Chest X-Ray 2/21/18 0100 Signed





Impressions: 





 Service Date/Time:  Wednesday, February 21, 2018 01:10 - CONCLUSION: No acute 





 disease.       Darío Maxwell MD 








Differential Diagnosis


Acute coronary syndrome, versus anxiety disorder, versus acid reflux, versus 

pulmonary embolism


Narrative Course


During the course of the patient's emergency department visit, the patient's 

history, examination, and differential diagnosis were reviewed with the 

patient. The patient was placed on a cardiac monitor with oximetry and frequent 

blood pressure monitoring. The patient had  IV access obtained and blood work 

sent for analysis.  The patient had an EKG done on arrival that shows a sinus 

rhythm, nonspecific T-wave abnormalities, QRS duration 93 ms,  ms, no 

acute ST segment elevation.  T waves are inverted in lead III.





The patient was initially provided nitroglycerin sublingual 1, nitroglycerin 1 

inch to the chest wall.  The patient on reexamination reported being pain free.





The patient's studies were reviewed and remarkable for CMP is remarkable for 

CO2 of 34, BUN 19, GFR of 79, calcium 8.1, cardiac enzymes within normal limits

, lipase 156.  White count is 5.2, hemoglobin 13.5, platelets 138, lymphocytes 

56.1.  A chest x-ray shows no acute cardiopulmonary disease.





The patient's results were discussed with the patient, including the plan of 

care. I explained that further testing and/ or monitoring is indicated based on 

the patient's history, examination, and/ or laboratory findings. Therefore, I 

recommended admission for additional evaluation. The patient expressed 

understanding and was agreeable with this plan. The patient was admitted to the 

hospital in stable condition and sent to a bed under the care of the chest pain.





Diagnosis





 Primary Impression:  


 Chest pain, rule out acute myocardial infarction





Admitting Information


Admitting Physician Requests:  Observation











Chandrika Delgado MD Feb 21, 2018 02:13

## 2018-02-21 NOTE — HHI.DCPOC
Discharge Care Plan


Diagnosis:  


(1) Chest pain


(2) COPD (chronic obstructive pulmonary disease)


(3) Tobacco abuse


(4) Schizophrenia


Goals to Promote Your Health


* To prevent worsening of your condition and complications


* To maintain your health at the optimal level


Directions to Meet Your Goals


*** Take your medications as prescribed


*** Follow your dietary instruction


*** Follow activity as directed








*** Keep your appointments as scheduled


*** Take your immunizations and boosters as scheduled


*** If your symptoms worsen call your PCP, if no PCP go to Urgent Care Center 

or Emergency Room***


*** Smoking is Dangerous to Your Health. Avoid second hand smoke***


***Call the 24-hour hour crisis hotline for domestic abuse at 1-956.935.9908***











Eliezer Pandey Feb 21, 2018 13:56

## 2018-02-21 NOTE — HHI.HP
HPI


Primary Care Physician


Unknown


Chief Complaint


Chest pain


History of Present Illness


This is a 62-year-old male that presents to ED with a complaint of chest 

discomfort that he states he has had intermittently for 6 months.  He points to 

the center of his chest indicate where the discomfort is located.  Cannot 

describe the intensity of it.  He cannot recall being nauseous or sweaty but 

states that he was short of breath with it yesterday.  Cannot recall how long 

the discomfort lasted or what brings on the discomfort.  Cannot recall prior 

cardiac evaluation.  Currently has no complaints.





Review of Systems


General: Patient denies fevers, chills recent, and recent travel


HEENT: Patient denies headache, sore throat, difficulty swallowing.  


Cardiovascular: Has the chest discomfort as mentioned above.  Denies sensation 

of heart beating rapidly or irregularly.  No syncope.  Denies diaphoresis.


Respiratory: He recalls being short of breath yesterday.  Denies inspirational 

chest discomfort.  Denies coughing wheezing or hemoptysis.  


GI: Patient denies nausea, vomiting, diarrhea, abdominal pain, bloody stools.


Musculoskeletal: Patient denies joint pain or edema.  Denies calf pain or edema.


Neurovascular: Patient denies numbness, tingling, weakness in extremities.  

Denies headache.


Endocrine: Denies polyuria and polydipsia.


Hematologic: Denies easy bruising.


Skin: Denies rash or itching.





Past Family Social History


Allergies:  


Coded Allergies:  


     No Known Allergies (Verified  Allergy, Unknown, 18)


 Per MAR faxed by [facility] [phone].


Past Medical History


COPD, schizophrenia, tobacco abuse.


Past Surgical History


Cannot recall surgical history.


Reported Medications





Reported Meds & Active Scripts


Active


Depakote ER (Divalproex Sodium) 500 Mg Michael 1,000 Mg PO HS


Gabapentin 100 Mg Cap 100 Mg PO TID


Reported


Trihexyphenidyl (Trihexyphenidyl HCl) 2 Mg Tab 2 Mg PO BID


Mirtazapine 15 Mg Tab 15 Mg PO HS


Meloxicam 7.5 Mg Tab 7.5 Mg PO DAILY


Haloperidol 5 Mg Tab 5 Mg PO BID


Active Ordered Medications





Current Medications








 Medications


  (Trade)  Dose


 Ordered  Sig/Jennie


 Route  Start Time


 Stop Time Status Last Admin


 


  (NS Flush)  2 ml  UNSCH  PRN


 IVF  18 01:00


     


 


 


  (NS Flush)  2 ml  UNSCH  PRN


 IV FLUSH  18 04:15


     


 


 


  (NS Flush)  2 ml  BID


 IV FLUSH  18 09:00


    18 09:47


 


 


  (Tylenol)  500 mg  Q4H  PRN


 PO  18 04:15


     


 


 


  (Duoneb Neb)  1 ampule  Q4HR NEB  PRN


 INH  18 13:00


     


 








Family History


He does not know his family medical history.


Social History


Admits to smoking approximately 1 pack of cigarettes daily for more than 40 

years.  Denies alcohol or illicit drugs.





Physical Exam


Vital Signs





Vital Signs








  Date Time  Temp Pulse Resp B/P (MAP) Pulse Ox O2 Delivery O2 Flow Rate FiO2


 


18 09:27     100 Nasal Cannula 1.50 


 


18 08:00 95.4 50 14 177/102 (127) 100   


 


18 06:33  52 18 162/92 (115)    


 


18 05:19     100 Nasal Cannula 2.00 


 


18 04:45 97.6 56 20 184/93 (123) 100   


 


18 01:10     100 Nasal Cannula 2.00 


 


18 01:04  65 18 121/78 (92) 98 Room Air  


 


18 00:58 98.1 62 18 122/77 (92) 96   








Physical Exam


GENERAL: This is a well-nourished, well-developed patient, in no apparent 

distress.  Patient speaks in clear complete sentences.  Patient is pleasant.


HEENT: Head is atraumatic and normocephalic.  Neck is supple without 

lymphadenopathy and trachea is midline.  No JVD or carotid bruits.


CARDIOVASCULAR: Regular rate and rhythm without murmurs, gallops, or rubs. 


RESPIRATORY:  Breath sounds equal bilaterally.  There are expiratory wheezing.  

No rales or rhonchi.  Chest wall is nontender.  No use of accessory muscles.


GASTROINTESTINAL: Abdomen is nontender, nondistended.  Abdomen soft.  No 

obvious pulsatile mass or bruit.  No CVA tenderness.  Strong femoral pulses 

bilaterally.  Normal bowel sounds in all quadrants.


MUSCULOSKELETAL: Patient is moving upper and lower extremities freely.  No calf 

tenderness or edema, no Homans sign.  Strong pulses in upper and lower 

extremities.


NEUROLOGICAL: Patient is alert and oriented.  Cranial nerves 2-12 are grossly 

intact.  No focal deficits and speech is clear.


SKIN: No rash and turgor is normal.


Laboratory





Laboratory Tests








Test


  18


01:10 18


04:50


 


White Blood Count 5.2  


 


Red Blood Count 4.22  


 


Hemoglobin 13.5  


 


Hematocrit 37.2  


 


Mean Corpuscular Volume 88.2  


 


Mean Corpuscular Hemoglobin 32.0  


 


Mean Corpuscular Hemoglobin


Concent 36.2 


  


 


 


Red Cell Distribution Width 15.8  


 


Platelet Count 138  


 


Mean Platelet Volume 7.7  


 


Neutrophils (%) (Auto) 30.7  


 


Lymphocytes (%) (Auto) 56.1  


 


Monocytes (%) (Auto) 8.2  


 


Eosinophils (%) (Auto) 3.6  


 


Basophils (%) (Auto) 1.4  


 


Neutrophils # (Auto) 1.6  


 


Lymphocytes # (Auto) 2.9  


 


Monocytes # (Auto) 0.4  


 


Eosinophils # (Auto) 0.2  


 


Basophils # (Auto) 0.1  


 


CBC Comment AUTO DIFF  


 


Differential Comment


  AUTO DIFF


CONFIRMED 


 


 


Platelet Estimate LOW  


 


Platelet Morphology Comment NORMAL  


 


Red Cell Morphology Comment NORMAL  


 


Prothrombin Time 12.0  


 


Prothromb Time International


Ratio 1.2 


  


 


 


Activated Partial


Thromboplast Time 25.3 


  


 


 


Blood Urea Nitrogen 19  


 


Creatinine 0.96  


 


Random Glucose 94  


 


Total Protein 7.4  


 


Albumin 3.3  


 


Calcium Level 8.1  


 


Magnesium Level 2.2  


 


Alkaline Phosphatase 50  


 


Aspartate Amino Transf


(AST/SGOT) 33 


  


 


 


Alanine Aminotransferase


(ALT/SGPT) 50 


  


 


 


Total Bilirubin 0.6  


 


Sodium Level 142  


 


Potassium Level 3.7  


 


Chloride Level 103  


 


Carbon Dioxide Level 34.0  


 


Anion Gap 5  


 


Estimat Glomerular Filtration


Rate 79 


  


 


 


Total Creatine Kinase 90  54 


 


Troponin I LESS THAN 0.02  LESS THAN 0.02 


 


Lipase 156  








Result Diagram:  


18 0110                                                                   

             18 0110





Imaging





Last 48 hours Impressions








Chest X-Ray 18 0100 Signed





Impressions: 





 Service Date/Time:  2018 01:10 - CONCLUSION: No acute 





 disease.       Darío Maxwell MD 








Course


EKGs have been sinus bradycardia rate of 54 and sinus rhythm rate of 61 without 

significant ST segment depressions or elevations.





Caprini VTE Risk Assessment


Caprini VTE Risk Assessment:  Mod/High Risk (score >= 2)


Caprini Risk Assessment Model











 Point Value = 1          Point Value = 2  Point Value = 3  Point Value = 5


 


Age 41-60


Minor surgery


BMI > 25 kg/m2


Swollen legs


Varicose veins


Pregnancy or postpartum


History of unexplained or recurrent


   spontaneous 


Oral contraceptives or hormone


   replacement


Sepsis (< 1 month)


Serious lung disease, including


   pneumonia (< 1 month)


Abnormal pulmonary function


Acute myocardial infarction


Congestive heart failure (< 1 month)


History of inflammatory bowel disease


Medical patient at bed rest Age 61-74


Arthroscopic surgery


Major open surgery (> 45 min)


Laparoscopic surgery (> 45 min)


Malignancy


Confined to bed (> 72 hours)


Immobilizing plaster cast


Central venous access Age >= 75


History of VTE


Family history of VTE


Factor V Leiden


Prothrombin 74691M


Lupus anticoagulant


Anticardiolipin antibodies


Elevated serum homocysteine


Heparin-induced thrombocytopenia


Other congenital or acquired


   thrombophilia Stroke (< 1 month)


Elective arthroplasty


Hip, pelvis, or leg fracture


Acute spinal cord injury (< 1 month)








Prophylaxis Regimen











   Total Risk


Factor Score Risk Level Prophylaxis Regimen


 


0-1      Low Early ambulation


 


2 Moderate Order ONE of the following:


*Sequential Compression Device (SCD)


*Heparin 5000 units SQ BID


 


3-4 Higher Order ONE of the following medications:


*Heparin 5000 units SQ TID


*Enoxaparin/Lovenox 40 mg SQ daily (WT < 150 kg, CrCl > 30 mL/min)


*Enoxaparin/Lovenox 30 mg SQ daily (WT < 150 kg, CrCl > 10-29 mL/min)


*Enoxaparin/Lovenox 30 mg SQ BID (WT < 150 kg, CrCl > 30 mL/min)


AND/OR


*Sequential Compression Device (SCD)


 


5 or more Highest Order ONE of the following medications:


*Heparin 5000 units SQ TID (Preferred with Epidurals)


*Enoxaparin/Lovenox 40 mg SQ daily (WT < 150 kg, CrCl > 30 mL/min)


*Enoxaparin/Lovenox 30 mg SQ daily (WT < 150 kg, CrCl > 10-29 mL/min)


*Enoxaparin/Lovenox 30 mg SQ BID (WT < 150 kg, CrCl > 30 mL/min)


AND


*Sequential Compression Device (SCD)











Assessment and Plan


Assessment and Plan


* Chest pain: Patient has had first 2 sets of cardiac enzymes and EKGs for 

ruling out purposes.  He was seen by Dr. Tima Neumann of cardiology in the 

chest pain center and will undergo a Lexiscan myocardial perfusion stress test.

  He will be discharged home with a stress test as nonischemic with 

instructions to follow-up with PCP and to return to ED for interval issues.


* COPD: Patient advised to quit smoking.  We will have DuoNeb's as needed.


* Tobacco abuse: Patient has been counseled on importance of smoking cessation.


* Schizophrenia: Resume medication at discharge.


Patient is stable at this time.  He is agreeable to this plan.











Eliezer Pandey 2018 12:36

## 2018-02-21 NOTE — RADRPT
EXAM DATE/TIME:  02/21/2018 10:57 

 

[facility] COMPARISON:     

No previous studies available for comparison.

 

 

INDICATIONS :     

Abdominal pain radiating to the substernal chest with dyspnea. Angina. 

                           

 

DOSE:     

25.6 mCi Tc99m Myoview at stress.

                     8.6 mCi Tc99m Myoview at rest.

                     0.4 mg Lexiscan

                       

 

 

STRESS SYMPTOMS:      

None.

                       

 

 

EJECTION FRACTION:       

38%

                       

 

MEDICAL HISTORY :     

Chronic obstructive pulmonary disease.   Schizophrenia. Smoker.

 

SURGICAL HISTORY :      

Cholecystectomy.   

 

ENCOUNTER:     

Initial

 

ACUITY:     

1 day

 

PAIN SCALE:     

8/10

 

LOCATION:      

Substernal chest 

 

TECHNIQUE:     

The patient underwent pharmacologic stress with infusion of prescribed dose.  Continuous ECG tracing 
was monitored during stress.  Gated SPECT imaging was performed after stress and conventional SPECT i
maging was performed at rest.  The examination was performed on a SPECT/CT scanner, both attenuation 
and non-corrected datasets were reviewed.

 

FINDINGS:     

 

DISTRIBUTION:     

The maximum perfused segment at stress is in the anterolateral wall.

 

PERFUSION STUDY:     

The pattern of perfusion at stress is within normal limits.

 

GATED STUDY:     

There is global hypokinesis with ejection fraction 38%.

 

CONCLUSION:     

1. No significant reversibility to suggest ischemia.

2. Global hypokinesis with ejection fraction 38%.

 

RISK CATEGORY:     High (>3% Annual Mortality Rate)

 

 

 

 

 Ildefonso Pacheco MD on February 21, 2018 at 12:51           

Board Certified Radiologist.

 This report was verified electronically.

## 2018-02-21 NOTE — RADRPT
EXAM DATE/TIME:  02/21/2018 01:10 

 

[facility] COMPARISON:     

CHEST SINGLE AP, July 23, 2017, 6:40.

 

                     

INDICATIONS :     

Short of breath.

                     

 

MEDICAL HISTORY :            

Chronic obstructive pulmonary disease.   

 

SURGICAL HISTORY :     

None.   

 

ENCOUNTER:     

Initial                                        

 

ACUITY:     

1 day      

 

PAIN SCORE:     

0/10

 

LOCATION:     

Bilateral  chest

 

FINDINGS:     

A single view of the chest demonstrates the lungs to be symmetrically aerated without evidence of mas
s, infiltrate or effusion.  The cardiomediastinal contours are unremarkable.  Osseous structures are 
intact. There are multiple surgical clips in the right side of the abdomen. There are overlying elect
rocardiogram leads and oxygen tubing.

 

CONCLUSION:     No acute disease.  

 

 

 

 Darío Maxwell MD on February 21, 2018 at 1:40           

Board Certified Radiologist.

 This report was verified electronically.

## 2018-02-21 NOTE — EKG
Date Performed: 02/21/2018       Time Performed: 04:59:37

 

PTAGE:      62 years

 

EKG:      SINUS BRADYCARDIA BORDERLINE ECG 

 

NO PREVIOUS TRACING            

 

DOCTOR:   Tima Neumann  Interpretating Date/Time  02/21/2018 15:51:39

## 2018-04-03 ENCOUNTER — HOSPITAL ENCOUNTER (INPATIENT)
Dept: HOSPITAL 17 - NEPD | Age: 63
LOS: 6 days | Discharge: ANOTHER HOSPITAL | DRG: 885 | End: 2018-04-09
Attending: PSYCHIATRY & NEUROLOGY | Admitting: PSYCHIATRY & NEUROLOGY
Payer: COMMERCIAL

## 2018-04-03 VITALS
HEART RATE: 75 BPM | TEMPERATURE: 98 F | RESPIRATION RATE: 16 BRPM | SYSTOLIC BLOOD PRESSURE: 125 MMHG | OXYGEN SATURATION: 95 % | DIASTOLIC BLOOD PRESSURE: 88 MMHG

## 2018-04-03 VITALS
RESPIRATION RATE: 16 BRPM | HEART RATE: 60 BPM | DIASTOLIC BLOOD PRESSURE: 72 MMHG | TEMPERATURE: 97.2 F | SYSTOLIC BLOOD PRESSURE: 108 MMHG | OXYGEN SATURATION: 98 %

## 2018-04-03 VITALS
RESPIRATION RATE: 18 BRPM | TEMPERATURE: 98.2 F | HEART RATE: 76 BPM | SYSTOLIC BLOOD PRESSURE: 141 MMHG | DIASTOLIC BLOOD PRESSURE: 97 MMHG | OXYGEN SATURATION: 98 %

## 2018-04-03 VITALS
RESPIRATION RATE: 17 BRPM | SYSTOLIC BLOOD PRESSURE: 137 MMHG | TEMPERATURE: 97.9 F | DIASTOLIC BLOOD PRESSURE: 89 MMHG | HEART RATE: 74 BPM | OXYGEN SATURATION: 98 %

## 2018-04-03 VITALS — BODY MASS INDEX: 24.26 KG/M2 | WEIGHT: 160.06 LBS | HEIGHT: 68 IN

## 2018-04-03 VITALS
SYSTOLIC BLOOD PRESSURE: 130 MMHG | TEMPERATURE: 97.8 F | DIASTOLIC BLOOD PRESSURE: 90 MMHG | HEART RATE: 73 BPM | OXYGEN SATURATION: 96 % | RESPIRATION RATE: 16 BRPM

## 2018-04-03 VITALS
DIASTOLIC BLOOD PRESSURE: 82 MMHG | OXYGEN SATURATION: 97 % | SYSTOLIC BLOOD PRESSURE: 130 MMHG | RESPIRATION RATE: 16 BRPM | HEART RATE: 72 BPM

## 2018-04-03 DIAGNOSIS — F41.9: ICD-10-CM

## 2018-04-03 DIAGNOSIS — F32.9: ICD-10-CM

## 2018-04-03 DIAGNOSIS — R00.1: ICD-10-CM

## 2018-04-03 DIAGNOSIS — F17.210: ICD-10-CM

## 2018-04-03 DIAGNOSIS — J44.9: ICD-10-CM

## 2018-04-03 DIAGNOSIS — F20.0: Primary | ICD-10-CM

## 2018-04-03 DIAGNOSIS — R45.851: ICD-10-CM

## 2018-04-03 LAB
ALBUMIN SERPL-MCNC: 3.5 GM/DL (ref 3.4–5)
ALP SERPL-CCNC: 53 U/L (ref 45–117)
ALT SERPL-CCNC: 36 U/L (ref 12–78)
APAP SERPL-MCNC: (no result) MCG/ML (ref 10–30)
AST SERPL-CCNC: 26 U/L (ref 15–37)
BASOPHILS # BLD AUTO: 0 TH/MM3 (ref 0–0.2)
BASOPHILS NFR BLD: 0.5 % (ref 0–2)
BILIRUB SERPL-MCNC: 0.4 MG/DL (ref 0.2–1)
BUN SERPL-MCNC: 15 MG/DL (ref 7–18)
CALCIUM SERPL-MCNC: 8.3 MG/DL (ref 8.5–10.1)
CHLORIDE SERPL-SCNC: 104 MEQ/L (ref 98–107)
CREAT SERPL-MCNC: 0.78 MG/DL (ref 0.6–1.3)
EOSINOPHIL # BLD: 0.1 TH/MM3 (ref 0–0.4)
EOSINOPHIL NFR BLD: 1.3 % (ref 0–4)
ERYTHROCYTE [DISTWIDTH] IN BLOOD BY AUTOMATED COUNT: 15.4 % (ref 11.6–17.2)
GFR SERPLBLD BASED ON 1.73 SQ M-ARVRAT: 100 ML/MIN (ref 89–?)
GLUCOSE SERPL-MCNC: 96 MG/DL (ref 74–106)
HCO3 BLD-SCNC: 32.2 MEQ/L (ref 21–32)
HCT VFR BLD CALC: 41.1 % (ref 39–51)
HGB BLD-MCNC: 14.3 GM/DL (ref 13–17)
LYMPHOCYTES # BLD AUTO: 2.5 TH/MM3 (ref 1–4.8)
LYMPHOCYTES NFR BLD AUTO: 35.1 % (ref 9–44)
MCH RBC QN AUTO: 31.8 PG (ref 27–34)
MCHC RBC AUTO-ENTMCNC: 34.8 % (ref 32–36)
MCV RBC AUTO: 91.3 FL (ref 80–100)
MONOCYTE #: 0.6 TH/MM3 (ref 0–0.9)
MONOCYTES NFR BLD: 8.5 % (ref 0–8)
NEUTROPHILS # BLD AUTO: 3.9 TH/MM3 (ref 1.8–7.7)
NEUTROPHILS NFR BLD AUTO: 54.6 % (ref 16–70)
PLATELET # BLD: 196 TH/MM3 (ref 150–450)
PMV BLD AUTO: 7.5 FL (ref 7–11)
PROT SERPL-MCNC: 7.9 GM/DL (ref 6.4–8.2)
RBC # BLD AUTO: 4.5 MIL/MM3 (ref 4.5–5.9)
SODIUM SERPL-SCNC: 142 MEQ/L (ref 136–145)
WBC # BLD AUTO: 7.1 TH/MM3 (ref 4–11)

## 2018-04-03 PROCEDURE — 80164 ASSAY DIPROPYLACETIC ACD TOT: CPT

## 2018-04-03 PROCEDURE — 93005 ELECTROCARDIOGRAM TRACING: CPT

## 2018-04-03 PROCEDURE — 85025 COMPLETE CBC W/AUTO DIFF WBC: CPT

## 2018-04-03 PROCEDURE — 99285 EMERGENCY DEPT VISIT HI MDM: CPT

## 2018-04-03 PROCEDURE — 83036 HEMOGLOBIN GLYCOSYLATED A1C: CPT

## 2018-04-03 PROCEDURE — 80061 LIPID PANEL: CPT

## 2018-04-03 PROCEDURE — 84443 ASSAY THYROID STIM HORMONE: CPT

## 2018-04-03 PROCEDURE — 80307 DRUG TEST PRSMV CHEM ANLYZR: CPT

## 2018-04-03 PROCEDURE — 80053 COMPREHEN METABOLIC PANEL: CPT

## 2018-04-03 NOTE — PD
HPI


Chief Complaint:  Psychiatric Symptoms


Time Seen by Provider:  02:26


Travel History


International Travel<30 days:  No


Contact w/Intl Traveler<30days:  No


Traveled to known affect area:  No





History of Present Illness


HPI


Patient is a 62-year-old male presenting to the emergency department under 

[place] act due to suicidal ideations.  According to the [place] act patient was 

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  Patient 

advised that he was considering ending his life due to the voices he was 

hearing and the depression he was dealing with at this time.  Patient was then 

placed under Baker act and brought to [place].  Onset of symptoms is unknown.  

Symptoms are severe in nature.  Unknown aggravating factors.





PFSH


Past Medical History


Arthritis:  Yes


Anxiety:  Yes


Depression:  Yes


Chest Pain:  Yes


COPD:  Yes


Cerebrovascular Accident:  No


Diabetes:  No


Diminished Hearing:  No


Endocrine:  No


Gastrointestinal Disorders:  No


GERD:  No


Genitourinary:  No


Hiatal Hernia:  No


Immune Disorder:  No


Implanted Vascular Access Dvce:  No


Kidney Stones:  No


Musculoskeletal:  Yes


Neurologic:  No


Psychiatric:  Yes (Schizophrenia)


Reproductive:  No


Respiratory:  No


Immunizations Current:  Yes


Migraines:  No


Radiation Therapy:  No


Renal Failure:  No


Schizophrenia:  Yes


Sickle Cell Disease:  No


Thyroid Disease:  No


Ulcer:  No


Pregnant?:  Not Pregnant





Past Surgical History


Abdominal Surgery:  Yes (GALL BLADDER REMOVED)


AICD:  No


Arteriovenous Shunt:  No


Cardiac Surgery:  No


Cholecystectomy:  Yes


Ear Surgery:  No


Endocrine Surgery:  No


Eye Surgery:  No


Genitourinary Surgery:  No


Gynecologic Surgery:  No


Insulin Pump:  No


Joint Replacement:  No


Oral Surgery:  No


Pacemaker:  No


Thoracic Surgery:  No


Other Surgery:  Yes





Social History


Alcohol Use:  No


Tobacco Use:  No


Substance Use:  No





Allergies-Medications


(Allergen,Severity, Reaction):  


Coded Allergies:  


     No Known Allergies (Verified  Allergy, Unknown, 2/21/18)


 Per MAR faxed by [facility] [employer] [phone].


Reported Meds & Prescriptions





Reported Meds & Active Scripts


Active


Depakote ER (Divalproex Sodium) 500 Mg Michael 1,000 Mg PO HS


Gabapentin 100 Mg Cap 100 Mg PO TID


Reported


Trihexyphenidyl (Trihexyphenidyl HCl) 2 Mg Tab 2 Mg PO BID


Mirtazapine 15 Mg Tab 15 Mg PO HS


Meloxicam 7.5 Mg Tab 7.5 Mg PO DAILY


Haloperidol 5 Mg Tab 5 Mg PO BID








Review of Systems


Except as stated in HPI:  all other systems reviewed are Neg


Psychiatric:  Positive: Suicidal Ideations, Disorder of Thought, Mood Disorder





Physical Exam


Narrative


GENERAL: Disheveled, well-developed,  male in no acute distress


SKIN: Warm and dry.


HEAD: Atraumatic. Normocephalic. 


EYES: Pupils equal and round. No scleral icterus. No injection or drainage. 


ENT: No nasal bleeding or discharge.  Mucous membranes pink and moist.


NECK: Trachea midline. No JVD. 


CARDIOVASCULAR: Regular rate and rhythm.  


RESPIRATORY: No accessory muscle use. Clear to auscultation. Breath sounds 

equal bilaterally. 


GASTROINTESTINAL: Abdomen soft, non-tender, nondistended. Hepatic and splenic 

margins not palpable. 


MUSCULOSKELETAL: Extremities without clubbing, cyanosis, or edema. No obvious 

deformities. 


NEUROLOGICAL: Awake and alert. No obvious cranial nerve deficits.  Motor 

grossly within normal limits. Five out of 5 muscle strength in the arms and 

legs.  Normal speech.


PSYCHIATRIC: Appropriate mood and affect; insight and judgment normal.





Data


Data


Last Documented VS





Vital Signs








  Date Time  Temp Pulse Resp B/P (MAP) Pulse Ox O2 Delivery O2 Flow Rate FiO2


 


4/3/18 02:35 98.2 76 18 141/97 (112) 98 Room Air  








Orders





 Orders


Complete Blood Count With Diff (4/3/18 02:27)


Comprehensive Metabolic Panel (4/3/18 02:27)


Thyroid Stimulating Hormone (4/3/18 02:27)


Urinalysis - C+S If Indicated (4/3/18 02:27)


Psych Screen (4/3/18 02:27)


Drug Screen, Random Urine (4/3/18 02:27)


Alcohol (Ethanol) (4/3/18 02:27)


Salicylates (Aspirin) (4/3/18 02:27)


Tylenol (Acetaminophen) (4/3/18 02:27)


Valproic Acid (Depakene) (4/3/18 02:27)





Labs





Laboratory Tests








Test


  4/3/18


02:35


 


White Blood Count 7.1 TH/MM3 


 


Red Blood Count 4.50 MIL/MM3 


 


Hemoglobin 14.3 GM/DL 


 


Hematocrit 41.1 % 


 


Mean Corpuscular Volume 91.3 FL 


 


Mean Corpuscular Hemoglobin 31.8 PG 


 


Mean Corpuscular Hemoglobin


Concent 34.8 % 


 


 


Red Cell Distribution Width 15.4 % 


 


Platelet Count 196 TH/MM3 


 


Mean Platelet Volume 7.5 FL 


 


Neutrophils (%) (Auto) 54.6 % 


 


Lymphocytes (%) (Auto) 35.1 % 


 


Monocytes (%) (Auto) 8.5 % 


 


Eosinophils (%) (Auto) 1.3 % 


 


Basophils (%) (Auto) 0.5 % 


 


Neutrophils # (Auto) 3.9 TH/MM3 


 


Lymphocytes # (Auto) 2.5 TH/MM3 


 


Monocytes # (Auto) 0.6 TH/MM3 


 


Eosinophils # (Auto) 0.1 TH/MM3 


 


Basophils # (Auto) 0.0 TH/MM3 


 


CBC Comment DIFF FINAL 


 


Differential Comment  


 


Blood Urea Nitrogen 15 MG/DL 


 


Creatinine 0.78 MG/DL 


 


Random Glucose 96 MG/DL 


 


Total Protein 7.9 GM/DL 


 


Albumin 3.5 GM/DL 


 


Calcium Level 8.3 MG/DL 


 


Alkaline Phosphatase 53 U/L 


 


Aspartate Amino Transf


(AST/SGOT) 26 U/L 


 


 


Alanine Aminotransferase


(ALT/SGPT) 36 U/L 


 


 


Total Bilirubin 0.4 MG/DL 


 


Sodium Level 142 MEQ/L 


 


Potassium Level 4.0 MEQ/L 


 


Chloride Level 104 MEQ/L 


 


Carbon Dioxide Level 32.2 MEQ/L 


 


Anion Gap 6 MEQ/L 


 


Estimat Glomerular Filtration


Rate 100 ML/MIN 


 


 


Thyroid Stimulating Hormone


3rd Gen 3.580 uIU/ML 


 


 


Salicylates Level


  LESS THAN 1.7


MG/DL


 


Acetaminophen Level


  LESS THAN 2.0


MCG/ML


 


Valproic Acid (Depakene) Level 90 MCG/ML 


 


Ethyl Alcohol Level


  LESS THAN 3


MG/DL











MDM


Medical Decision Making


Medical Screen Exam Complete:  Yes


Emergency Medical Condition:  Yes


Interpretation(s)





Vital Signs








  Date Time  Temp Pulse Resp B/P (MAP) Pulse Ox O2 Delivery O2 Flow Rate FiO2


 


4/3/18 02:35 98.2 76 18 141/97 (112) 98 Room Air  


 


4/3/18 00:44 97.8 73 16 130/90 (103) 96   








Differential Diagnosis


Mood disorder versus substance abuse versus schizophrenia versus psychosis 

versus metabolic abnormality versus other


Narrative Course


Patient presented to emergency department under Baker act for psychiatric 

evaluation secondary to suicidal ideations and hallucinations.  Mental health 

screening discussed with the patient. Psychiatric screen ordered.  Labs reviewed

, no acute findings identified.  Patient is medically cleared for psychiatric 

evaluation





Diagnosis





 Primary Impression:  


 Medical clearance for psychiatric admission


Condition:  Stable











Claudette Pratt [facility] Apr 3, 2018 03:52

## 2018-04-04 VITALS
RESPIRATION RATE: 16 BRPM | HEART RATE: 61 BPM | OXYGEN SATURATION: 97 % | DIASTOLIC BLOOD PRESSURE: 71 MMHG | SYSTOLIC BLOOD PRESSURE: 113 MMHG | TEMPERATURE: 97.6 F

## 2018-04-04 VITALS
HEART RATE: 55 BPM | SYSTOLIC BLOOD PRESSURE: 119 MMHG | RESPIRATION RATE: 16 BRPM | TEMPERATURE: 97.3 F | OXYGEN SATURATION: 95 % | DIASTOLIC BLOOD PRESSURE: 81 MMHG

## 2018-04-04 LAB
CHOLEST SERPL-MCNC: 157 MG/DL (ref 120–200)
CHOLESTEROL/ HDL RATIO: 4.38 RATIO
HBA1C MFR BLD: 5 % (ref 4.3–6)
HDLC SERPL-MCNC: 35.8 MG/DL (ref 40–60)
LDLC SERPL-MCNC: 105 MG/DL (ref 0–99)
TRIGL SERPL-MCNC: 81 MG/DL (ref 42–150)

## 2018-04-04 RX ADMIN — NEOMYCIN AND POLYMYXIN B SULFATES AND BACITRACIN ZINC SCH APPLIC: 400; 3.5; 5 OINTMENT TOPICAL at 17:30

## 2018-04-04 RX ADMIN — MIRTAZAPINE SCH MG: 15 TABLET, FILM COATED ORAL at 21:19

## 2018-04-04 RX ADMIN — MELOXICAM SCH MG: 7.5 TABLET ORAL at 09:00

## 2018-04-04 RX ADMIN — GABAPENTIN SCH MG: 100 CAPSULE ORAL at 09:00

## 2018-04-04 RX ADMIN — GABAPENTIN SCH MG: 100 CAPSULE ORAL at 17:42

## 2018-04-04 RX ADMIN — DIVALPROEX SODIUM SCH MG: 500 TABLET, EXTENDED RELEASE ORAL at 21:20

## 2018-04-04 RX ADMIN — HALOPERIDOL SCH MG: 5 TABLET ORAL at 12:30

## 2018-04-04 RX ADMIN — GABAPENTIN SCH MG: 100 CAPSULE ORAL at 13:00

## 2018-04-04 RX ADMIN — HALOPERIDOL SCH MG: 5 TABLET ORAL at 21:19

## 2018-04-04 RX ADMIN — TRIHEXYPHENIDYL HYDROCHLORIDE SCH MG: 2 TABLET ORAL at 21:45
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
## 2018-04-05 VITALS
HEART RATE: 56 BPM | RESPIRATION RATE: 16 BRPM | TEMPERATURE: 98.1 F | SYSTOLIC BLOOD PRESSURE: 98 MMHG | OXYGEN SATURATION: 98 % | DIASTOLIC BLOOD PRESSURE: 54 MMHG

## 2018-04-05 VITALS
SYSTOLIC BLOOD PRESSURE: 179 MMHG | TEMPERATURE: 98.3 F | OXYGEN SATURATION: 99 % | RESPIRATION RATE: 16 BRPM | HEART RATE: 81 BPM | DIASTOLIC BLOOD PRESSURE: 94 MMHG

## 2018-04-05 RX ADMIN — GABAPENTIN SCH MG: 100 CAPSULE ORAL at 08:40

## 2018-04-05 RX ADMIN — DIVALPROEX SODIUM SCH MG: 500 TABLET, EXTENDED RELEASE ORAL at 20:58

## 2018-04-05 RX ADMIN — NEOMYCIN AND POLYMYXIN B SULFATES AND BACITRACIN ZINC SCH APPLIC: 400; 3.5; 5 OINTMENT TOPICAL at 08:39

## 2018-04-05 RX ADMIN — HALOPERIDOL SCH MG: 5 TABLET ORAL at 08:40

## 2018-04-05 RX ADMIN — HALOPERIDOL SCH MG: 5 TABLET ORAL at 09:00

## 2018-04-05 RX ADMIN — MELOXICAM SCH MG: 7.5 TABLET ORAL at 09:00

## 2018-04-05 RX ADMIN — MIRTAZAPINE SCH MG: 15 TABLET, FILM COATED ORAL at 20:58

## 2018-04-05 RX ADMIN — HALOPERIDOL SCH MG: 10 TABLET ORAL at 20:58

## 2018-04-05 RX ADMIN — TRIHEXYPHENIDYL HYDROCHLORIDE SCH MG: 2 TABLET ORAL at 08:40

## 2018-04-05 RX ADMIN — GABAPENTIN SCH MG: 100 CAPSULE ORAL at 17:27

## 2018-04-05 RX ADMIN — TRIHEXYPHENIDYL HYDROCHLORIDE SCH MG: 2 TABLET ORAL at 20:58

## 2018-04-05 RX ADMIN — GABAPENTIN SCH MG: 100 CAPSULE ORAL at 12:41

## 2018-04-05 NOTE — HHI.PYPN
Subjective


Remarks


Patient seen in his room nurse Keaton, chart review, patient compliant 

medications, patient discussed with nurse.  Patient laying in bed calm 

cooperative did recognize me.  Patient states he is "nightmares" last night 

that interrupted his sleep.  Though today he denies voices or visions.  Denies 

suicidality.  Will increase Haldol to 5 mg a.m. 10 mg at bedtime.  Now states 

would consider returning to [facility]





Review of Systems


Except as stated in HPI:  all other systems reviewed are Neg





Mental Status Examination


Appearance:  Disheveled


Consciousness:  Alert


Orientation:  Person, Place, Date/Time


Motor Activity:  Other (unable to ascertain patient laying down)


Speech:  Unremarkable


Language:  Adequate


Fund of Knowledge:  Adequate


Attention and Concentration:  Other (fair)


Memory:  Unremarkable (fair)


Mood:  Other


Affect:  Other (euthymic to somewhat restricted)


Thought Process & Associations:  Other (decreased range and intensity)


Thought Content:  Bizarre thinking


Hallucination Type:  Auditory


Delusion Type:  Paranoid (mild)


Suicidal Ideation:  No (denies this time)


Suicidal Plan:  No


Suicidal Intention:  No


Homicidal Ideation:  No


Homicidal Plan:  No


Homicidal Intention:  No


Insight:  Fair


Judgment:  Adequate (they are)





Results


Vitals/IOs





Vital Signs








  Date Time  Temp Pulse Resp B/P (MAP) Pulse Ox O2 Delivery O2 Flow Rate FiO2


 


4/5/18 05:17 98.0 56 16 141/80 (100) 95   


 


4/3/18 12:09      Room Air  











Assessment & Plan


Problem List:  


(1) Schizophrenia, paranoid, chronic


ICD Codes:  F20.0 - Paranoid schizophrenia


Status:  Acute


Assessment & Plan


Estimated LOS:  days patient continue psychotic with auditory hallucinations, 

though is calm cooperative.  He seems to be processing things somewhat better.  

She medication adjustment above


Justification for Cont. Inpt.


At this time patient decompensated placed in a lower level of care


Discharge Planning


Probable return to [facility]


Request HC Surrog/Guard Advoc?:  No











Edgardo Eli MD Apr 5, 2018 08:52

## 2018-04-06 VITALS
DIASTOLIC BLOOD PRESSURE: 77 MMHG | SYSTOLIC BLOOD PRESSURE: 110 MMHG | TEMPERATURE: 98.2 F | HEART RATE: 72 BPM | RESPIRATION RATE: 17 BRPM | OXYGEN SATURATION: 98 %

## 2018-04-06 VITALS
RESPIRATION RATE: 18 BRPM | HEART RATE: 53 BPM | OXYGEN SATURATION: 97 % | DIASTOLIC BLOOD PRESSURE: 62 MMHG | SYSTOLIC BLOOD PRESSURE: 114 MMHG | TEMPERATURE: 97.4 F

## 2018-04-06 VITALS
OXYGEN SATURATION: 98 % | DIASTOLIC BLOOD PRESSURE: 77 MMHG | SYSTOLIC BLOOD PRESSURE: 110 MMHG | RESPIRATION RATE: 17 BRPM | HEART RATE: 72 BPM | TEMPERATURE: 98.2 F

## 2018-04-06 RX ADMIN — MELOXICAM SCH MG: 7.5 TABLET ORAL at 08:23

## 2018-04-06 RX ADMIN — GABAPENTIN SCH MG: 100 CAPSULE ORAL at 13:21

## 2018-04-06 RX ADMIN — MIRTAZAPINE SCH MG: 15 TABLET, FILM COATED ORAL at 21:22

## 2018-04-06 RX ADMIN — HALOPERIDOL SCH MG: 10 TABLET ORAL at 21:23

## 2018-04-06 RX ADMIN — TRIHEXYPHENIDYL HYDROCHLORIDE SCH MG: 2 TABLET ORAL at 08:21

## 2018-04-06 RX ADMIN — TRIHEXYPHENIDYL HYDROCHLORIDE SCH MG: 2 TABLET ORAL at 21:23

## 2018-04-06 RX ADMIN — GABAPENTIN SCH MG: 100 CAPSULE ORAL at 08:21

## 2018-04-06 RX ADMIN — NEOMYCIN AND POLYMYXIN B SULFATES AND BACITRACIN ZINC SCH APPLIC: 400; 3.5; 5 OINTMENT TOPICAL at 08:22

## 2018-04-06 RX ADMIN — GABAPENTIN SCH MG: 100 CAPSULE ORAL at 17:40

## 2018-04-06 RX ADMIN — DIVALPROEX SODIUM SCH MG: 500 TABLET, EXTENDED RELEASE ORAL at 21:22

## 2018-04-06 RX ADMIN — HALOPERIDOL SCH MG: 5 TABLET ORAL at 08:21

## 2018-04-07 VITALS
DIASTOLIC BLOOD PRESSURE: 74 MMHG | OXYGEN SATURATION: 96 % | TEMPERATURE: 96.8 F | HEART RATE: 56 BPM | RESPIRATION RATE: 16 BRPM | SYSTOLIC BLOOD PRESSURE: 133 MMHG

## 2018-04-07 VITALS
RESPIRATION RATE: 16 BRPM | HEART RATE: 62 BPM | TEMPERATURE: 98 F | DIASTOLIC BLOOD PRESSURE: 88 MMHG | OXYGEN SATURATION: 97 % | SYSTOLIC BLOOD PRESSURE: 129 MMHG

## 2018-04-07 RX ADMIN — GABAPENTIN SCH MG: 100 CAPSULE ORAL at 12:15

## 2018-04-07 RX ADMIN — DIVALPROEX SODIUM SCH MG: 500 TABLET, EXTENDED RELEASE ORAL at 21:15

## 2018-04-07 RX ADMIN — TRIHEXYPHENIDYL HYDROCHLORIDE SCH MG: 2 TABLET ORAL at 21:15

## 2018-04-07 RX ADMIN — NEOMYCIN AND POLYMYXIN B SULFATES AND BACITRACIN ZINC SCH APPLIC: 400; 3.5; 5 OINTMENT TOPICAL at 09:21

## 2018-04-07 RX ADMIN — HALOPERIDOL SCH MG: 5 TABLET ORAL at 09:21

## 2018-04-07 RX ADMIN — GABAPENTIN SCH MG: 100 CAPSULE ORAL at 09:21

## 2018-04-07 RX ADMIN — TRIHEXYPHENIDYL HYDROCHLORIDE SCH MG: 2 TABLET ORAL at 09:21

## 2018-04-07 RX ADMIN — GABAPENTIN SCH MG: 100 CAPSULE ORAL at 17:05

## 2018-04-07 RX ADMIN — MELOXICAM SCH MG: 7.5 TABLET ORAL at 09:21

## 2018-04-07 RX ADMIN — MIRTAZAPINE SCH MG: 15 TABLET, FILM COATED ORAL at 21:15

## 2018-04-07 RX ADMIN — HALOPERIDOL SCH MG: 10 TABLET ORAL at 21:15
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
## 2018-04-08 VITALS — DIASTOLIC BLOOD PRESSURE: 72 MMHG | HEART RATE: 75 BPM | SYSTOLIC BLOOD PRESSURE: 131 MMHG

## 2018-04-08 VITALS
DIASTOLIC BLOOD PRESSURE: 74 MMHG | HEART RATE: 98 BPM | SYSTOLIC BLOOD PRESSURE: 121 MMHG | RESPIRATION RATE: 16 BRPM | OXYGEN SATURATION: 96 % | TEMPERATURE: 98.1 F

## 2018-04-08 VITALS — SYSTOLIC BLOOD PRESSURE: 138 MMHG | HEART RATE: 61 BPM | DIASTOLIC BLOOD PRESSURE: 85 MMHG

## 2018-04-08 VITALS
SYSTOLIC BLOOD PRESSURE: 102 MMHG | HEART RATE: 48 BPM | DIASTOLIC BLOOD PRESSURE: 61 MMHG | RESPIRATION RATE: 16 BRPM | OXYGEN SATURATION: 95 % | TEMPERATURE: 97.4 F

## 2018-04-08 RX ADMIN — GABAPENTIN SCH MG: 100 CAPSULE ORAL at 08:58

## 2018-04-08 RX ADMIN — TRIHEXYPHENIDYL HYDROCHLORIDE SCH MG: 2 TABLET ORAL at 22:04

## 2018-04-08 RX ADMIN — MELOXICAM SCH MG: 7.5 TABLET ORAL at 08:58

## 2018-04-08 RX ADMIN — HALOPERIDOL SCH MG: 5 TABLET ORAL at 08:58

## 2018-04-08 RX ADMIN — HALOPERIDOL SCH MG: 10 TABLET ORAL at 22:04

## 2018-04-08 RX ADMIN — GABAPENTIN SCH MG: 100 CAPSULE ORAL at 17:40

## 2018-04-08 RX ADMIN — DIVALPROEX SODIUM SCH MG: 500 TABLET, EXTENDED RELEASE ORAL at 22:04

## 2018-04-08 RX ADMIN — NEOMYCIN AND POLYMYXIN B SULFATES AND BACITRACIN ZINC SCH APPLIC: 400; 3.5; 5 OINTMENT TOPICAL at 08:58

## 2018-04-08 RX ADMIN — GABAPENTIN SCH MG: 100 CAPSULE ORAL at 12:57

## 2018-04-08 RX ADMIN — MIRTAZAPINE SCH MG: 15 TABLET, FILM COATED ORAL at 22:04

## 2018-04-08 RX ADMIN — TRIHEXYPHENIDYL HYDROCHLORIDE SCH MG: 2 TABLET ORAL at 08:58

## 2018-04-08 NOTE — HHI.PYPN
Subjective


Remarks


Reviewed electronic medical record and discussed case with staff.  Patient was 

found in bed in his room.  Follow-up performed with nurse in room.  He reports 

that his appetite is been good and he has been sleeping well.  He states that 

he is "kind of tired".  His nurse advised that the patient was bradycardic 

earlier today.  Had a stat EKG performed which did show sinus bradycardia with 

a rate of 59 and some nonspecific T-wave abnormality however patient's QT 

interval is within normal limits.  There is no edema of the ankles and patient 

denies any trouble breathing.  As patient remains asymptomatic we will not 

order a medical consult at this time.  He denies being suicidal, homicidal, 

having auditory or visual hallucinations.





Mental Status Examination


Appearance:  Disheveled


Consciousness:  Alert


Orientation:  Person, Place, Date/Time


Motor Activity:  Other (unable to ascertain patient laying down)


Speech:  Unremarkable


Language:  Adequate


Fund of Knowledge:  Adequate


Attention and Concentration:  Other (fair)


Memory:  Unremarkable (fair)


Mood:  Other


Affect:  Other (euthymic to somewhat restricted)


Thought Process & Associations:  Other (decreased range and intensity)


Thought Content:  Bizarre thinking


Hallucination Type:  Auditory


Delusion Type:  Paranoid (mild)


Suicidal Ideation:  No (denies this time)


Suicidal Plan:  No


Suicidal Intention:  No


Homicidal Ideation:  No


Homicidal Plan:  No


Homicidal Intention:  No


Insight:  Fair


Judgment:  Adequate (they are)





Results


Vitals/IOs





Vital Signs








  Date Time  Temp Pulse Resp B/P (MAP) Pulse Ox O2 Delivery O2 Flow Rate FiO2


 


4/8/18 07:45  61  138/85 (102)    


 


4/8/18 05:44 97.4  16  95   











Assessment & Plan


Problem List:  


(1) Schizophrenia, paranoid, chronic


ICD Codes:  F20.0 - Paranoid schizophrenia


Status:  Acute


Assessment & Plan


Estimated LOS: We will continue with treatment care plan.  Days


Justification for Cont. Inpt.


Moving this patient to a lower level of care would likely result in a 

decompensation.


Request HC Surrog/Guard Advoc?:  No











Mami Tang Apr 8, 2018 14:53

## 2018-04-09 VITALS
HEART RATE: 64 BPM | RESPIRATION RATE: 16 BRPM | TEMPERATURE: 98.1 F | OXYGEN SATURATION: 97 % | DIASTOLIC BLOOD PRESSURE: 77 MMHG | SYSTOLIC BLOOD PRESSURE: 116 MMHG

## 2018-04-09 RX ADMIN — HALOPERIDOL SCH MG: 5 TABLET ORAL at 10:17

## 2018-04-09 RX ADMIN — NEOMYCIN AND POLYMYXIN B SULFATES AND BACITRACIN ZINC SCH APPLIC: 400; 3.5; 5 OINTMENT TOPICAL at 10:18

## 2018-04-09 RX ADMIN — GABAPENTIN SCH MG: 100 CAPSULE ORAL at 10:17

## 2018-04-09 RX ADMIN — TRIHEXYPHENIDYL HYDROCHLORIDE SCH MG: 2 TABLET ORAL at 10:17

## 2018-04-09 RX ADMIN — GABAPENTIN SCH MG: 100 CAPSULE ORAL at 13:51

## 2018-04-09 RX ADMIN — MELOXICAM SCH MG: 7.5 TABLET ORAL at 10:17

## 2018-04-09 NOTE — EKG
Date Performed: 04/08/2018       Time Performed: 12:15:46

 

PTAGE:      62 years

 

EKG:      SINUS BRADYCARDIA NONSPECIFIC T-WAVE ABNORMALITY BORDERLINE ECG

 

PREVIOUS TRACING       : 02/21/2018 04.59 Since previous tracing, T-waves somewhat more flattened ant
erolaterally, otherwise no significant change.

 

DOCTOR:   Salo Massey  Interpretating Date/Time  04/09/2018 09:26:28
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
## 2018-04-12 ENCOUNTER — HOSPITAL ENCOUNTER (EMERGENCY)
Dept: HOSPITAL 17 - NED | Age: 63
Discharge: LEFT BEFORE DISCHARGE | End: 2018-04-12
Payer: COMMERCIAL

## 2018-04-12 VITALS
DIASTOLIC BLOOD PRESSURE: 93 MMHG | SYSTOLIC BLOOD PRESSURE: 125 MMHG | TEMPERATURE: 99.4 F | RESPIRATION RATE: 17 BRPM | OXYGEN SATURATION: 96 % | HEART RATE: 99 BPM

## 2018-04-12 VITALS — WEIGHT: 163.14 LBS | BODY MASS INDEX: 24.73 KG/M2 | HEIGHT: 68 IN

## 2018-04-12 DIAGNOSIS — F99: Primary | ICD-10-CM

## 2018-04-12 PROCEDURE — 99281 EMR DPT VST MAYX REQ PHY/QHP: CPT

## 2018-04-19 ENCOUNTER — HOSPITAL ENCOUNTER (EMERGENCY)
Dept: HOSPITAL 17 - NED | Age: 63
LOS: 1 days | Discharge: LEFT BEFORE DISCHARGE | End: 2018-04-20
Payer: MEDICAID

## 2018-04-19 DIAGNOSIS — Z53.21: Primary | ICD-10-CM

## 2018-04-19 PROCEDURE — 99281 EMR DPT VST MAYX REQ PHY/QHP: CPT

## 2018-04-20 ENCOUNTER — HOSPITAL ENCOUNTER (EMERGENCY)
Dept: HOSPITAL 17 - NEPD | Age: 63
Discharge: HOME | End: 2018-04-20
Payer: MEDICAID

## 2018-04-20 VITALS
OXYGEN SATURATION: 99 % | HEART RATE: 59 BPM | DIASTOLIC BLOOD PRESSURE: 105 MMHG | SYSTOLIC BLOOD PRESSURE: 179 MMHG | RESPIRATION RATE: 20 BRPM

## 2018-04-20 VITALS
HEART RATE: 82 BPM | SYSTOLIC BLOOD PRESSURE: 130 MMHG | DIASTOLIC BLOOD PRESSURE: 76 MMHG | OXYGEN SATURATION: 98 % | RESPIRATION RATE: 20 BRPM

## 2018-04-20 VITALS
RESPIRATION RATE: 16 BRPM | OXYGEN SATURATION: 97 % | TEMPERATURE: 97.3 F | HEART RATE: 57 BPM | SYSTOLIC BLOOD PRESSURE: 137 MMHG | DIASTOLIC BLOOD PRESSURE: 82 MMHG

## 2018-04-20 VITALS
HEART RATE: 55 BPM | DIASTOLIC BLOOD PRESSURE: 77 MMHG | RESPIRATION RATE: 15 BRPM | OXYGEN SATURATION: 98 % | SYSTOLIC BLOOD PRESSURE: 126 MMHG

## 2018-04-20 VITALS — WEIGHT: 165.35 LBS | HEIGHT: 68 IN | BODY MASS INDEX: 25.06 KG/M2

## 2018-04-20 DIAGNOSIS — F32.9: ICD-10-CM

## 2018-04-20 DIAGNOSIS — Z79.899: ICD-10-CM

## 2018-04-20 DIAGNOSIS — F16.90: ICD-10-CM

## 2018-04-20 DIAGNOSIS — F20.9: Primary | ICD-10-CM

## 2018-04-20 LAB
ALBUMIN SERPL-MCNC: 3.2 GM/DL (ref 3.4–5)
ALP SERPL-CCNC: 41 U/L (ref 45–117)
ALT SERPL-CCNC: 35 U/L (ref 12–78)
APAP SERPL-MCNC: (no result) MCG/ML (ref 10–30)
AST SERPL-CCNC: 26 U/L (ref 15–37)
BASOPHILS # BLD AUTO: 0 TH/MM3 (ref 0–0.2)
BASOPHILS NFR BLD: 0.6 % (ref 0–2)
BILIRUB SERPL-MCNC: 0.4 MG/DL (ref 0.2–1)
BUN SERPL-MCNC: 18 MG/DL (ref 7–18)
CALCIUM SERPL-MCNC: 8.1 MG/DL (ref 8.5–10.1)
CHLORIDE SERPL-SCNC: 105 MEQ/L (ref 98–107)
CREAT SERPL-MCNC: 0.9 MG/DL (ref 0.6–1.3)
EOSINOPHIL # BLD: 0.1 TH/MM3 (ref 0–0.4)
EOSINOPHIL NFR BLD: 1.7 % (ref 0–4)
ERYTHROCYTE [DISTWIDTH] IN BLOOD BY AUTOMATED COUNT: 14.6 % (ref 11.6–17.2)
GFR SERPLBLD BASED ON 1.73 SQ M-ARVRAT: 85 ML/MIN (ref 89–?)
GLUCOSE SERPL-MCNC: 85 MG/DL (ref 74–106)
HCO3 BLD-SCNC: 33.1 MEQ/L (ref 21–32)
HCT VFR BLD CALC: 37.2 % (ref 39–51)
HGB BLD-MCNC: 12.9 GM/DL (ref 13–17)
LYMPHOCYTES # BLD AUTO: 2.8 TH/MM3 (ref 1–4.8)
LYMPHOCYTES NFR BLD AUTO: 49.3 % (ref 9–44)
MCH RBC QN AUTO: 31.6 PG (ref 27–34)
MCHC RBC AUTO-ENTMCNC: 34.7 % (ref 32–36)
MCV RBC AUTO: 90.9 FL (ref 80–100)
MONOCYTE #: 0.5 TH/MM3 (ref 0–0.9)
MONOCYTES NFR BLD: 8.3 % (ref 0–8)
NEUTROPHILS # BLD AUTO: 2.3 TH/MM3 (ref 1.8–7.7)
NEUTROPHILS NFR BLD AUTO: 40.1 % (ref 16–70)
PLATELET # BLD: 181 TH/MM3 (ref 150–450)
PMV BLD AUTO: 7.5 FL (ref 7–11)
PROT SERPL-MCNC: 7.1 GM/DL (ref 6.4–8.2)
RBC # BLD AUTO: 4.09 MIL/MM3 (ref 4.5–5.9)
SODIUM SERPL-SCNC: 141 MEQ/L (ref 136–145)
WBC # BLD AUTO: 5.6 TH/MM3 (ref 4–11)

## 2018-04-20 PROCEDURE — 99283 EMERGENCY DEPT VISIT LOW MDM: CPT

## 2018-04-20 PROCEDURE — 80053 COMPREHEN METABOLIC PANEL: CPT

## 2018-04-20 PROCEDURE — 80307 DRUG TEST PRSMV CHEM ANLYZR: CPT

## 2018-04-20 PROCEDURE — 85025 COMPLETE CBC W/AUTO DIFF WBC: CPT

## 2018-04-20 PROCEDURE — 84443 ASSAY THYROID STIM HORMONE: CPT

## 2018-04-20 PROCEDURE — 80164 ASSAY DIPROPYLACETIC ACD TOT: CPT

## 2018-04-20 NOTE — PD
Physical Exam


Date Seen by Provider:  Apr 20, 2018


Narrative


This patient has now been seen by psychiatry.  He reports that he is here for a 

refill of his psychiatric medications.  He has been informed by psychiatry that 

he needs to go to [facility] for that.





Data


Data


Last Documented VS





Vital Signs








  Date Time  Temp Pulse Resp B/P (MAP) Pulse Ox O2 Delivery O2 Flow Rate FiO2


 


4/20/18 12:00  55 15 126/77 (93) 98 Room Air  


 


4/20/18 01:06 97.3       








Orders





 Orders


Complete Blood Count With Diff (4/20/18 01:25)


Comprehensive Metabolic Panel (4/20/18 01:25)


Thyroid Stimulating Hormone (4/20/18 01:25)


Valproic Acid (Depakene) (4/20/18 01:25)


Psych Screen (4/20/18 01:25)


Drug Screen, Random Urine (4/20/18 01:25)


Alcohol (Ethanol) (4/20/18 01:25)


Salicylates (Aspirin) (4/20/18 01:25)


Tylenol (Acetaminophen) (4/20/18 01:25)


Diet Regular Basic (4/20/18 Breakfast)





Labs





Laboratory Tests








Test


  4/20/18


01:30


 


White Blood Count 5.6 TH/MM3 


 


Red Blood Count 4.09 MIL/MM3 


 


Hemoglobin 12.9 GM/DL 


 


Hematocrit 37.2 % 


 


Mean Corpuscular Volume 90.9 FL 


 


Mean Corpuscular Hemoglobin 31.6 PG 


 


Mean Corpuscular Hemoglobin


Concent 34.7 % 


 


 


Red Cell Distribution Width 14.6 % 


 


Platelet Count 181 TH/MM3 


 


Mean Platelet Volume 7.5 FL 


 


Neutrophils (%) (Auto) 40.1 % 


 


Lymphocytes (%) (Auto) 49.3 % 


 


Monocytes (%) (Auto) 8.3 % 


 


Eosinophils (%) (Auto) 1.7 % 


 


Basophils (%) (Auto) 0.6 % 


 


Neutrophils # (Auto) 2.3 TH/MM3 


 


Lymphocytes # (Auto) 2.8 TH/MM3 


 


Monocytes # (Auto) 0.5 TH/MM3 


 


Eosinophils # (Auto) 0.1 TH/MM3 


 


Basophils # (Auto) 0.0 TH/MM3 


 


CBC Comment DIFF FINAL 


 


Differential Comment  


 


Blood Urea Nitrogen 18 MG/DL 


 


Creatinine 0.90 MG/DL 


 


Random Glucose 85 MG/DL 


 


Total Protein 7.1 GM/DL 


 


Albumin 3.2 GM/DL 


 


Calcium Level 8.1 MG/DL 


 


Alkaline Phosphatase 41 U/L 


 


Aspartate Amino Transf


(AST/SGOT) 26 U/L 


 


 


Alanine Aminotransferase


(ALT/SGPT) 35 U/L 


 


 


Total Bilirubin 0.4 MG/DL 


 


Sodium Level 141 MEQ/L 


 


Potassium Level 3.7 MEQ/L 


 


Chloride Level 105 MEQ/L 


 


Carbon Dioxide Level 33.1 MEQ/L 


 


Anion Gap 3 MEQ/L 


 


Estimat Glomerular Filtration


Rate 85 ML/MIN 


 


 


Thyroid Stimulating Hormone


3rd Gen 2.210 uIU/ML 


 


 


Salicylates Level


  LESS THAN 1.7


MG/DL


 


Urine Opiates Screen NEG 


 


Acetaminophen Level


  LESS THAN 2.0


MCG/ML


 


Urine Barbiturates Screen NEG 


 


Valproic Acid (Depakene) Level 80 MCG/ML 


 


Urine Amphetamines Screen NEG 


 


Urine Benzodiazepines Screen NEG 


 


Urine Cocaine Screen NEG 


 


Urine Cannabinoids Screen NEG 


 


Ethyl Alcohol Level


  LESS THAN 3


MG/DL











MDM


Supervised Visit with YEISON:  No


Diagnosis





 Primary Impression:  


 Medical clearance for psychiatric admission





***Additional Instruction:  


Follow up at [facility]


Disposition:  01 DISCHARGE HOME


Condition:  Stable











Barb Gillis MD Apr 20, 2018 13:08

## 2018-04-20 NOTE — PD
HPI


Chief Complaint:  Psychiatric Symptoms


Time Seen by Provider:  01:24


Travel History


International Travel<30 days:  No


Contact w/Intl Traveler<30days:  No


Traveled to known affect area:  No





History of Present Illness


HPI


62-year-old white male presents emergency department on a voluntary basis for 

psychological evaluation.  Patient states that he lives at [facility].  He 

has a history of schizophrenia.  He has not taken his medications today.  He 

does report taking LSD 6 hours ago.  He states that he would like to sleep and 

then see the screener and get back on his  medicines.  He denies any suicidal 

or homicidal ideation.  No toxic ingestion.  He states that he deliberately 

took the LSD.





PFSH


Past Medical History


Arthritis:  Yes


Autoimmune Disease:  No


Anxiety:  Yes


Depression:  Yes


Cancer:  No


Cardiovascular Problems:  No


High Cholesterol:  No


Chemotherapy:  No


Chest Pain:  Yes


Congestive Heart Failure:  No


COPD:  Yes


Cerebrovascular Accident:  No


Diabetes:  No


Diminished Hearing:  No


Endocrine:  No


Gastrointestinal Disorders:  No


GERD:  No


Genitourinary:  No


Headaches:  No


Hiatal Hernia:  No


Immune Disorder:  No


Implanted Vascular Access Dvce:  No


Kidney Stones:  No


Musculoskeletal:  Yes


Neurologic:  No


Psychiatric:  Yes


Reproductive:  No


Respiratory:  No


Immunizations Current:  Yes


Migraines:  No


Radiation Therapy:  No


Renal Failure:  No


Schizophrenia:  Yes


Seizures:  No


Sickle Cell Disease:  No


Thyroid Disease:  No


Ulcer:  No


Tetanus Vaccination:  Unknown


Influenza Vaccination:  No





Past Surgical History


Abdominal Surgery:  Yes (GALL BLADDER REMOVED)


AICD:  No


Arteriovenous Shunt:  No


Cardiac Surgery:  No


Cholecystectomy:  Yes


Ear Surgery:  No


Endocrine Surgery:  No


Eye Surgery:  No


Genitourinary Surgery:  No


Gynecologic Surgery:  No


Insulin Pump:  No


Joint Replacement:  No


Oral Surgery:  No


Pacemaker:  No


Other Surgery:  Yes





Social History


Alcohol Use:  No


Tobacco Use:  No


Substance Use:  Yes





Allergies-Medications


(Allergen,Severity, Reaction):  


Coded Allergies:  


     No Known Allergies (Verified  Allergy, Unknown, 4/20/18)


 Per MAR faxed by [facility] [phone].


Reported Meds & Prescriptions





Reported Meds & Active Scripts


Active


Haloperidol 5 Mg Tab 5 Mg PO AS DIRECTED


     One by mouth a.m. 2 by mouth at bedtime


Trihexyphenidyl (Trihexyphenidyl HCl) 2 Mg Tab 2 Mg PO BID


Mirtazapine 15 Mg Tab 15 Mg PO HS


Meloxicam 7.5 Mg Tab 7.5 Mg PO DAILY


Depakote ER (Divalproex Sodium) 500 Mg Michael 1,000 Mg PO HS


Gabapentin 100 Mg Cap 100 Mg PO TID








Review of Systems


General / Constitutional:  No: Fever


Eyes:  No: Visual changes


HENT:  No: Headaches


Cardiovascular:  No: Chest Pain or Discomfort


Respiratory:  No: Shortness of Breath


Gastrointestinal:  No: Abdominal Pain


Genitourinary:  No: Dysuria


Musculoskeletal:  No: Pain


Skin:  No Rash


Neurologic:  No: Weakness


Psychiatric:  Positive: Depression, Mood Disorder, Substance Abuse, No: Anxiety

, Suicidal Ideations, Disorder of Thought, Homicidal Ideation


Endocrine:  No: Polydipsia


Hematologic/Lymphatic:  No: Easy Bruising





Physical Exam


Narrative


GENERAL: Well-nourished, well-developed patient.  Resting comfortable in 

examination room sleeping.  He awakens for exam history.


SKIN: Warm and dry.


HEAD: Normocephalic and atraumatic.


EYES: No scleral icterus. No injection or drainage. 


ENT: No nasal drainage noted. Mucous membranes pink. Airway patent.


NECK: Supple, trachea midline.  Moves head freely without obvious discomfort.


CARDIOVASCULAR: Regular rate and rhythm without murmurs, gallops, or rubs. 


RESPIRATORY: Breath sounds equal bilaterally. No accessory muscle use.


GASTROINTESTINAL: Abdomen soft, non-tender, nondistended. 


EXTREMITIES: No cyanosis or edema.


BACK: Nontender without obvious deformity. No CVA tenderness.


NEURO: Patient is alert and oriented. no sensorimotor deficits.  Nonfocal.  

Normal speech.


PSYCH: No delusions.  No auditory or visual hallucinations.





Data


Data


Last Documented VS





Vital Signs








  Date Time  Temp Pulse Resp B/P (MAP) Pulse Ox O2 Delivery O2 Flow Rate FiO2


 


4/20/18 01:06 97.3 57 16 137/82 (100) 97   








Orders





 Orders


Complete Blood Count With Diff (4/20/18 01:25)


Comprehensive Metabolic Panel (4/20/18 01:25)


Thyroid Stimulating Hormone (4/20/18 01:25)


Valproic Acid (Depakene) (4/20/18 01:25)


Psych Screen (4/20/18 01:25)


Drug Screen, Random Urine (4/20/18 01:25)


Alcohol (Ethanol) (4/20/18 01:25)


Salicylates (Aspirin) (4/20/18 01:25)


Tylenol (Acetaminophen) (4/20/18 01:25)





Labs





Laboratory Tests








Test


  4/20/18


01:30


 


White Blood Count 5.6 TH/MM3 


 


Red Blood Count 4.09 MIL/MM3 


 


Hemoglobin 12.9 GM/DL 


 


Hematocrit 37.2 % 


 


Mean Corpuscular Volume 90.9 FL 


 


Mean Corpuscular Hemoglobin 31.6 PG 


 


Mean Corpuscular Hemoglobin


Concent 34.7 % 


 


 


Red Cell Distribution Width 14.6 % 


 


Platelet Count 181 TH/MM3 


 


Mean Platelet Volume 7.5 FL 


 


Neutrophils (%) (Auto) 40.1 % 


 


Lymphocytes (%) (Auto) 49.3 % 


 


Monocytes (%) (Auto) 8.3 % 


 


Eosinophils (%) (Auto) 1.7 % 


 


Basophils (%) (Auto) 0.6 % 


 


Neutrophils # (Auto) 2.3 TH/MM3 


 


Lymphocytes # (Auto) 2.8 TH/MM3 


 


Monocytes # (Auto) 0.5 TH/MM3 


 


Eosinophils # (Auto) 0.1 TH/MM3 


 


Basophils # (Auto) 0.0 TH/MM3 


 


CBC Comment DIFF FINAL 


 


Differential Comment  


 


Salicylates Level


  LESS THAN 1.7


MG/DL


 


Urine Opiates Screen NEG 


 


Urine Barbiturates Screen NEG 


 


Urine Amphetamines Screen NEG 


 


Urine Benzodiazepines Screen NEG 


 


Urine Cocaine Screen NEG 


 


Urine Cannabinoids Screen NEG 











MDM


Medical Decision Making


Medical Screen Exam Complete:  Yes


Emergency Medical Condition:  Yes


Medical Record Reviewed:  Yes


Differential Diagnosis


MDM: High


Differential diagnoses: Schizophrenia, schizoaffective disorder, bipolar, 

anxiety, depression, adjustment reaction, mood disorder NOS, ODD, depressive 

disorder NOS,  psychosis NOS, substance induced mood disorder,  infection,

electrolyte abnormality, malingering.


Narrative Course


Mental health screening discussed with the patient.  Psychiatric screen ordered.





The patient reports taking LSD.  He does not appear to be under the influence 

of substances.  Patient is a poor historian and appears somnolent.  He arouses 

for exam and falls back asleep readily.





This is medical clearance for psychiatric admission





Diagnosis





 Primary Impression:  


 Medical clearance for psychiatric admission


Condition:  Ildefonso Monk Apr 20, 2018 02:42

## 2022-04-29 NOTE — HHI.PYPN
Subjective


Remarks


Patient seen for follow, chart review.  After discussion with nursing staff 

patient noted to be slightly suspicious and guarded but pleasant.  Patient was 

found walking in the hallway unable to engage in interview.  Patient states 

that he's been feeling "pretty good" denies any auditory or visual 

hallucinations denies tolerating medication well without any adverse drug 

reactions.  Patient mentions that he will likely return back to his [facility] but is 

concerned that "half of the people are using alcohol or drugs".  Patient aware 

that are presented from his [facility] will come tomorrow morning to meet with patient 

prior to discharge.  Patient at this time denies SI, HI, AVH or delusions.





Review of Systems


Except as stated in HPI:  all other systems reviewed are Neg





Objective


Alert:  Yes


[place]:  Person, Place, Date


Mood:  Calm


Affect:  Restricted (able to smile at times)


Memory Intact:  Immediate, Recent, Remote


Hallucinations:  Auditory (denies at this time)


Delusions:  No


Delusion Type:  Other (denies)


Suicidal:  Ideation (denies)


Homicidal:  Ideation (denies)


Insight/Judgment


Fair insight, fair impulse control and judgment


Labs





Laboratory Tests








Test 8/14/17





 11:31


 


Blood Urea Nitrogen 19 MG/DL (7-18)


 


Calcium Level 8.1 MG/DL





 (8.5-10.1)








Vitals/IOs





 Vital Signs








  Date Time  Temp Pulse Resp B/P Pulse Ox O2 Delivery O2 Flow Rate FiO2


 


8/16/17 05:08 97.6 53 16 113/78 95   


 


8/13/17 01:07        97











Assessment & Plan


Problem List:  


(1) Schizophrenia


ICD Code:  F20.9


Assessment & Plan


Patient this time denies any acute psychotic symptoms, reports responding well 

to current treatment regimen; denies any adverse drug reactions.  Patient 

states planning to return to his [facility], before be required to meet with a 

representative from his [facility] prior to be accepted back there.  Patient to 

continue current treatment, continued to encourage personal hygiene and 

participate in groups and activities.  Discharge planning in progress


Justification for Cont. Inpt.


Patient at risk for further decompensation if it lower level of care


Discharge Planning


In progress








Mani Petit MD Aug 16, 2017 13:50
Subjective


Remarks


Patient seen for follow-up, chart review.  Patient found walking around the 

halls noted to be in good spirits.  Patient states that he had been feeling well

, noted to have a decrease in auditory hallucinations since stated that once he 

was on the unit the had improved.  Patient states that his medications are 

"working for me" and then intensity has gone from a 8/10 upon admission to a 1/

10.  Patient denies any other mood symptom, denies any suicidal or homicidal 

ideation denies any other psychotic symptoms.  Patient reports that he feels 

comfortable returning back to his assisted living facility upon discharge





Review of Systems


Except as stated in HPI:  all other systems reviewed are Neg





Objective


Alert:  Yes


[place]:  Person, Place, Date


Mood:  Calm


Affect:  Restricted


Memory Intact:  Immediate, Recent, Remote


Hallucinations:  Auditory


Delusions:  No


Delusion Type:  Other (denies)


Suicidal:  Ideation (denies)


Homicidal:  Ideation (denies)


Insight/Judgment


Limited insight, fair impulse control and judgment


Vitals/IOs





 Vital Signs








  Date Time  Temp Pulse Resp B/P Pulse Ox O2 Delivery O2 Flow Rate FiO2


 


8/15/17 05:48 97.4 48 18  96   





    115/69    


 


8/13/17 01:07        97











Assessment & Plan


Problem List:  


(1) Schizophrenia


ICD Code:  F20.9


Assessment & Plan


Patient this time continues to endorse some auditory hallucinations but there 

are minimal.  He denies any other mood symptom, reports her bonding well to 

medication regimen and denies any adverse drug reactions.  Patient to continue 

current treatment regimen at this time.  Patient to continue to be in 

encouraged to maintain personal hygienic to participate in groups and 

activities while on the unit.  Discharge planning in progress


Justification for Cont. Inpt.


Patient risk for further decompensation if a lower level of care.


Discharge Planning


In progress








Mani Petit MD Aug 15, 2017 18:04
no